# Patient Record
Sex: FEMALE | Race: WHITE | NOT HISPANIC OR LATINO | Employment: FULL TIME | URBAN - METROPOLITAN AREA
[De-identification: names, ages, dates, MRNs, and addresses within clinical notes are randomized per-mention and may not be internally consistent; named-entity substitution may affect disease eponyms.]

---

## 2018-11-12 ENCOUNTER — TRANSFERRED RECORDS (OUTPATIENT)
Dept: HEALTH INFORMATION MANAGEMENT | Facility: CLINIC | Age: 23
End: 2018-11-12

## 2018-12-20 ENCOUNTER — ALLIED HEALTH/NURSE VISIT (OUTPATIENT)
Dept: NEUROLOGY | Facility: CLINIC | Age: 23
End: 2018-12-20
Payer: COMMERCIAL

## 2018-12-20 ENCOUNTER — OFFICE VISIT (OUTPATIENT)
Dept: NEUROLOGY | Facility: CLINIC | Age: 23
End: 2018-12-20
Payer: COMMERCIAL

## 2018-12-20 VITALS
SYSTOLIC BLOOD PRESSURE: 113 MMHG | WEIGHT: 142.4 LBS | RESPIRATION RATE: 16 BRPM | HEART RATE: 85 BPM | DIASTOLIC BLOOD PRESSURE: 71 MMHG

## 2018-12-20 DIAGNOSIS — G40.319 GENERALIZED CONVULSIVE EPILEPSY WITH INTRACTABLE EPILEPSY (H): Primary | ICD-10-CM

## 2018-12-20 DIAGNOSIS — R56.9 SEIZURES (H): Primary | ICD-10-CM

## 2018-12-20 RX ORDER — LAMOTRIGINE 100 MG/1
TABLET ORAL
Qty: 90 TABLET | Refills: 11 | Status: SHIPPED | OUTPATIENT
Start: 2019-01-20 | End: 2019-05-16

## 2018-12-20 RX ORDER — LAMOTRIGINE 25 MG/1
TABLET ORAL
Qty: 100 TABLET | Refills: 1 | Status: SHIPPED | OUTPATIENT
Start: 2018-12-20 | End: 2019-02-28

## 2018-12-20 RX ORDER — ZONISAMIDE 100 MG/1
200 CAPSULE ORAL
COMMUNITY
Start: 2018-11-12 | End: 2019-05-16

## 2018-12-20 RX ORDER — OXCARBAZEPINE 300 MG/1
TABLET, FILM COATED ORAL
COMMUNITY
Start: 2018-11-12 | End: 2019-07-30

## 2018-12-20 RX ORDER — PSEUDOEPHEDRINE HCL 30 MG
TABLET ORAL EVERY 4 HOURS PRN
COMMUNITY

## 2018-12-20 ASSESSMENT — PAIN SCALES - GENERAL: PAINLEVEL: NO PAIN (0)

## 2018-12-20 NOTE — PATIENT INSTRUCTIONS
Times of Days           Medication Tablet Size Number of Tablets/Capsules Notes      Lamotrigine      9 AM after breakfast  (Morning)   5 PM after dinner  (Night)        Week 1       25 mg  (1 tablet)      0 mg    Lamotrigine 25 mg tablet     Week 2       25 mg   (1 tablet)    25 mg   (1 tablet)   Lamotrigine 25 mg tablet    Week 3      50 mg   (2 tablets)   25 mg   (1 tablet)   Lamotrigine 25 mg tablet    Week 4      50 mg   (2 tablets)   50 mg   (2 tablets)   Lamotrigine 25 mg tablet    Week 5     75 mg   (3 tablets)   50 mg   (2 tablets)   Lamotrigine 25 mg tablet    Week 6      75 mg   (3 tablets)   75 mg   (3 tablets)   Lamotrigine 25 mg tablet   Week  7   100 mg   (4 tablets)  75 mg   (3 tablets)  Lamotrigine 25 mg tablet   Week 8   100 mg   (4 tablets)  100 mg   (4 tablets)   lamotrigine 100 mg tablet    Week 9   125 mg   (5 tablets)  100 mg   (4 tablets)  Lamotrigine 25 mg and 100 mg tablets   Week 10   150 mg     100 mg    Lamotrigine 25 mg and 100 mg tablets   Week 11   175 mg     100 mg     Lamotrigine 25 mg and 100 mg tablets   Week 12   200 mg     100 mg     Lamotrigine 25 mg and 100 mg tablets     Monitor for side effects, especially rash and mood changes, if any concerns please call our office. [phone]    Estrogen can affect lamotrigine     Continue oxcarbazepine 600 -900 and zonisamide 100 mg at night       Kellee Nuñez MD

## 2018-12-20 NOTE — PROGRESS NOTES
NEW PATIENT NOTE    Service Date: 12/20/2018      Thank you for sending Ana Paula Ramirez to [facility].        HISTORY OF PRESENT ILLNESS:  The patient is a 23-year-old right-handed female who had her first seizure at the age of 17.  Her first seizure occur approximately around 03/17/2013.  She was at a friend's house and out of sleep, had what was described as a seizure.  Her whole body was stiff with shivering, low amplitude trembling and the repetitive clicking noise.  She had another one on 03/28/2013, again this was out of sleep and subsequently she had an EEG which was normal.      Over the years, she has had a total of 8 seizures and it seems that she averages approximately 1-2 per year.  She was initially treated with levetiracetam around 2014.  Max dose was 1500 mg per day and that was not entirely helpful, because she had severe side effects, mainly of severe depression and severe irritability.  She states that she was fighting with other people a lot.  She was then transitioned at the age of 19 to oxcarbazepine, which has been gradually increased over the years to current dose.  However, the patient did have an episode of hyponatremia with sodium of 125 in 2018 and her dose was lowered a little bit by Dr. Barajas and zonisamide 100 mg was added.  She states that her last 2 seizures were in 08/2018 and in 10/2018.  Her oxcarbazepine maximum dose has been up to 17 as of 10/03/2018 and we do not have a zonisamide level.       SEIZURE TYPES:     Seizure type 1, she states she feels as though she is going in and out with her vision.  She describes it as though her contacts are not in her eye and her vision just feels off.  This lasts approximately 2 minutes.  She is often able to get into a secure place and sit down so that way she does not get hurt.  She did have 1 seizure in the evening at work, but the majority of the seizures are at nighttime she states.  This is followed by loss of  consciousness and awareness, whole body stiffening, her eyes go back and she is low amplitude trembling and the clicking noise in her throat.  She has not bitten her tongue.  No urinary incontinence or no significant trauma with these specific seizures, but again, she does get herself into a safe herself to prevent bodily injury.  When asked if she has early morning jerks, she denies that.  Her mom has not witnessed that she stares off into space or is unresponsive.  The patient denies having recurrent episodes of auras.      TRIGGERS:  When asked, she thinks that seizures are triggered by fatigue, lack of sleep, when blood sugar is low, dehydration, menstruation, failure to take medications.      EPILEPSY-RELATED RISK FACTORS:  No history of encephalitis, meningitis, strokes or brain tumors.  She was born via emergent .  She was 6 pounds 4 ounces.  No history of developmental delay.  She was walking and talking at the right age.  No febrile seizures.  No family history of epilepsy.        Past medical testing 2018, she had video EEG for approximately 1 week and the reports all within normal limits.  She states that her seizure medications were reduced.  EEGs in , I not able to access those records.  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  This was 2013.  Per the report; I do not have the actual images on file.  We did not have imaging completed here at [facility].  EEG at [facility] in  was normal.  Laboratory tests, highest level of oxcarbazepine in 10/2018 was 17.      ALLERGIES:  None.  The patient does have adverse reaction to levetiracetam with leg pain and mood instability she states.        PAST MEDICAL HISTORY:  Attention deficit disorder.  She stopped taking Vyvanse a year ago because of concerns that it can cause seizures.      FAMILY MEDICAL HISTORY:  None.      PAST SURGICAL HISTORY:   None.      SOCIAL HISTORY:  She has no history of events,  physical, verbal or sexual trauma.  She had a stable upbringing and supportive family.  She is currently in college for graphic design.  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  She is not .  She drinks 2-3 drinks a couple of times per month.  Does not smoke, does not do recreational drugs.  She has coffee intermittently.       PSYCHOLOGICAL HISTORY:  She currently does not have any depressive symptoms.  She does have a little bit of anxiety.  She describes herself to be very friendly, talkative, spiritual, a people pleaser.  She describes her [employer] to be Y[place].      REVIEW OF SYSTEMS:  No headaches, no vision changes, no shortness of breath, chest pain, nausea, vomiting, diarrhea.  No upper or lower extremity weakness or numbness.        FEMALE HISTORY:  She started menstruating at the age of 15.  She is not pregnant.  She would like to get pregnant in the future.      EXAMINATION /71   Pulse 85   Resp 16   Wt 142 lb 6.4 oz (64.6 kg)   GENERAL:  Alert and oriented x3.   CARDIOVASCULAR:  Regular rate and rhythm, positive S1, S2.   LUNGS:  Clear to auscultation bilaterally.   ABDOMEN:  Nondistended, nontender.  Normal active bowel sounds.      NEUROLOGICAL EXAMINATION   Mental Status and Higher Cortical Functions:  Alert and oriented to person, place, and time.  Speech fluent, with intact naming and repetition. No dysarthria.  Cranial Nerves (II-XII):  Pupils equal, round, and reactive to light.  Extraocular movements full with no nystagmus.  Visual fields full to confrontation.  Facial sensation intact to light touch, temperature, and pin prick.  Face symmetric at rest and with activation.  Hearing intact to finger rub bilaterally.  Tongue midline and palate elevation symmetric.  Sternocleidomastoid and trapezius 5/5 bilaterally.   Fundoscopic examination was unremarkable for pallor or edema bilaterally.    Motor:  Normal tone,  normal bulk, and no pronator drift.  No tremors or fasciculations. Arm/hand circumduction was symmetric.  Motor strength 5/5 in upper and lower extremities.   Sensation:  Intact to light touch, vibration, and temperature.    Coordination:  Normal finger-nose-finger, fine finger movements, and rapid alternating movements.  No ataxia or dysmetria.     Reflexes:  Deep tendon reflexes 2+ and symmetric throughout.    Gait:  Casual gait and stance normal.         ASSESSMENT:  A 23-year-old female who presents with recurrent seizure-like spells.  These spells are described as an aura, vision changes followed by whole body stiffening with low amplitude trembling and repetitive clicking noises.  I suspect the spell as a generalized tonic-clonic seizure.  This typically happens mainly at nighttime.  Age of onset was 17.  EEG today was notable for a burst of sharply contoured delta activity in the awake state.  It is difficult to decipher if these are generalized epileptiform discharges or a burst of delta slowing that are simply sharply contoured and representing a mild encephalopathy.  I suspect she does have epilepsy and just not sure if it is generalized or partial epilepsy.  Her aura suggests perhaps it may be a partial epilepsy.  However, with the age of onset, we should consider that this is a juvenile onset generalized epilepsy.  She does not have features consistent with juvenile myoclonic epilepsy, however.  I think it is best that we use broad-spectrum agent.  I would recommend that we start lamotrigine and wean her off of oxcarbazepine.  Additionally, she is not able to tolerate oxcarbazepine because her sodium dropped to 125.  Perhaps a combination of oxcarbazepine and zonisamide may be a beneficial combination for her.  Additionally, other medications that are broad-spectrum that may be considered in the future are Depakote (if on birth control), topiramate (if on birth control), Fycompa, Vimpat; not in this  particular order.      At this time, I do not think it would be helpful to do inpatient video EEG monitoring because her spells are 1-2 times per year, and I suspect that we might not capture anything.  She did have a 1-week evaluation at [facility] that was unrevealing.  I recommend we focus her energy on optimizing her anti-seizure medications.  The patient was advised not to drive and follow seizure precautions.      PLAN:   1.  Start lamotrigine.   2.  Continue zonisamide and oxcarbazepine.   3.  Do not drive.   4.  Check lamotrigine in 3 months.   5.  Nurse call in 2 weeks to check on how she is tolerating initiation of lamotrigine.   6.  Follow with Dr. Nuñez in 3 months.              Times of Days           Medication Tablet Size Number of Tablets/Capsules Notes      Lamotrigine      9 AM after breakfast  (Morning)   5 PM after dinner  (Night)        Week 1       25 mg  (1 tablet)      0 mg    Lamotrigine 25 mg tablet     Week 2       25 mg   (1 tablet)    25 mg   (1 tablet)   Lamotrigine 25 mg tablet    Week 3      50 mg   (2 tablets)   25 mg   (1 tablet)   Lamotrigine 25 mg tablet    Week 4      50 mg   (2 tablets)   50 mg   (2 tablets)   Lamotrigine 25 mg tablet    Week 5     75 mg   (3 tablets)   50 mg   (2 tablets)   Lamotrigine 25 mg tablet    Week 6      75 mg   (3 tablets)   75 mg   (3 tablets)   Lamotrigine 25 mg tablet   Week  7   100 mg   (4 tablets)  75 mg   (3 tablets)  Lamotrigine 25 mg tablet   Week 8   100 mg   (4 tablets)  100 mg   (4 tablets)   lamotrigine 100 mg tablet    Week 9   125 mg   (5 tablets)  100 mg   (4 tablets)  Lamotrigine 25 mg and 100 mg tablets   Week 10   150 mg     100 mg    Lamotrigine 25 mg and 100 mg tablets   Week 11   175 mg     100 mg     Lamotrigine 25 mg and 100 mg tablets   Week 12   200 mg     100 mg     Lamotrigine 25 mg and 100 mg tablets     Monitor for side effects, especially rash and mood changes, if any concerns please call our office.  [phone]    Estrogen can affect lamotrigine     Continue oxcarbazepine 600 -900 and zonisamide 100 mg at night       Thank you for allowing us to participate in Ms. Elisha Ramirez's care.        cc:   Sera Barajas MD    [facility]   [address] [facility] [place] [address]         I spent 60 minutes with the patient. During this time counseling and coordination of care exceeded 50% of the face to face visit time. I addressed all questions the patient/caregiver raised in regards to the patient's medical care.     Kellee Nuñez MD            D: 2018   T: 2018   MT: MIREILLE      Name:     ELISHA WOOD   MRN:      [id number]        Account:      [id number]   :      1995           Service Date: 2018      Document: [id number]

## 2018-12-20 NOTE — LETTER
Patient:  Ana Paula Rosenberg  :   1995  MRN:     [id number]        Ms.Taylor Rosenberg  [address] [facility] [address]        2019    Dear ,    We are writing to inform you of your test results.    Your test results fall within the expected range(s) or remain unchanged from previous results.  Years and is semi-levels are low.  It is not entirely clear why this is.  We can review this in clinic on your next visit.  Please continue lamotrigine titration.  If you have any questions give our clinic a call. Please continue with current treatment plan.    Resulted Orders   Oxcarbazepine level   Result Value Ref Range    10 Hydroxy Metabolite Level 23.8 10.0 - 35.0 ug/ml      Comment:      (Note)             Therapeutic efficacy has been demonstrated in             patients with trough 10-hydroxy metabolite             concentrations of 10.0 - 35.0 ug/ml.             Toxic concentrations have not been established.  Analysis performed by [facility] [place] [address]     Zonisamide Level Quantitative   Result Value Ref Range    Zonisamide Level Quant 3 (L) 10 - 40 ug/mL      Comment:      (Note)  INTERPRETIVE INFORMATION: Zonisamide  Therapeutic range: Not well established.  Toxic: Greater than 80 ug/mL  The proposed therapeutic range for seizure control is 10-40   ug/mL. Toxic concentrations may cause coma, seizures and   cardiac abnormalities. Pharmacokinetics varies widely,   particularly with co-medications and/or compromised renal   function.  Performed by [facility],  [address] [place] [id number] [phone]  [email or web address], Farhat Naranjo MD, Lab. Director     Sodium   Result Value Ref Range    Sodium 134 133 - 144 mmol/L       Kellee Nuñez MD               [id number]  1995

## 2018-12-20 NOTE — LETTER
2018       RE: Ana Paula Rosenberg  : 1995   MRN: [id number]      Dear Colleague,    Thank you for referring your patient, Ana Paula Rosenberg, to the [facility] EPILEPSY CARE at [facility]. Please see a copy of my visit note below.    NEW PATIENT NOTE    Service Date: 2018      Thank you for sending Ana Paula Ramirez to [facility] Epilepsy [facility].        HISTORY OF PRESENT ILLNESS:  The patient is a 23-year-old right-handed female who had her first seizure at the age of 17.  Her first seizure occur approximately around 2013.  She was at a friend's house and out of sleep, had what was described as a seizure.  Her whole body was stiff with shivering, low amplitude trembling and the repetitive clicking noise.  She had another one on 2013, again this was out of sleep and subsequently she had an EEG which was normal.      Over the years, she has had a total of 8 seizures and it seems that she averages approximately 1-2 per year.  She was initially treated with levetiracetam around .  Max dose was 1500 mg per day and that was not entirely helpful, because she had severe side effects, mainly of severe depression and severe irritability.  She states that she was fighting with other people a lot.  She was then transitioned at the age of 19 to oxcarbazepine, which has been gradually increased over the years to current dose.  However, the patient did have an episode of hyponatremia with sodium of 125 in 2018 and her dose was lowered a little bit by Dr. Barajas and zonisamide 100 mg was added.  She states that her last 2 seizures were in 2018 and in 10/2018.  Her oxcarbazepine maximum dose has been up to 17 as of 10/03/2018 and we do not have a zonisamide level.       SEIZURE TYPES:     Seizure type 1, she states she feels as though she is going in and out with her vision.  She describes it as though her contacts are not in her eye and her vision just feels off.   This lasts approximately 2 minutes.  She is often able to get into a secure place and sit down so that way she does not get hurt.  She did have 1 seizure in the evening at work, but the majority of the seizures are at nighttime she states.  This is followed by loss of consciousness and awareness, whole body stiffening, her eyes go back and she is low amplitude trembling and the clicking noise in her throat.  She has not bitten her tongue.  No urinary incontinence or no significant trauma with these specific seizures, but again, she does get herself into a safe herself to prevent bodily injury.  When asked if she has early morning jerks, she denies that.  Her mom has not witnessed that she stares off into space or is unresponsive.  The patient denies having recurrent episodes of auras.      TRIGGERS:  When asked, she thinks that seizures are triggered by fatigue, lack of sleep, when blood sugar is low, dehydration, menstruation, failure to take medications.      EPILEPSY-RELATED RISK FACTORS:  No history of encephalitis, meningitis, strokes or brain tumors.  She was born via emergent .  She was 6 pounds 4 ounces.  No history of developmental delay.  She was walking and talking at the right age.  No febrile seizures.  No family history of epilepsy.        Past medical testing 2018, she had video EEG for approximately 1 week and the reports all within normal limits.  She states that her seizure medications were reduced.  EEGs in , I not able to access those records.  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  This was 2013.  Per the report; I do not have the actual images on file.  We did not have imaging completed here at [facility].  EEG at [facility] in  was normal.  Laboratory tests, highest level of oxcarbazepine in 10/2018 was 17.      ALLERGIES:  None.  The patient does have adverse reaction to  levetiracetam with leg pain and mood instability she states.        PAST MEDICAL HISTORY:  Attention deficit disorder.  She stopped taking Vyvanse a year ago because of concerns that it can cause seizures.      FAMILY MEDICAL HISTORY:  None.      PAST SURGICAL HISTORY:  None.      SOCIAL HISTORY:  She has no history of events,  physical, verbal or sexual trauma.  She had a stable upbringing and supportive family.  She is currently in college for [employer] design.  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  She is not .  She drinks 2-3 drinks a couple of times per month.  Does not smoke, does not do recreational drugs.  She has coffee intermittently.       PSYCHOLOGICAL HISTORY:  She currently does not have any depressive symptoms.  She does have a little bit of anxiety.  She describes herself to be very friendly, talkative, spiritual, a people pleaser.  She describes her Islam to be Faith.      REVIEW OF SYSTEMS:  No headaches, no vision changes, no shortness of breath, chest pain, nausea, vomiting, diarrhea.  No upper or lower extremity weakness or numbness.        FEMALE HISTORY:  She started menstruating at the age of 15.  She is not pregnant.  She would like to get pregnant in the future.      EXAMINATION /71   Pulse 85   Resp 16   Wt 142 lb 6.4 oz (64.6 kg)   GENERAL:  Alert and oriented x3.   CARDIOVASCULAR:  Regular rate and rhythm, positive S1, S2.   LUNGS:  Clear to auscultation bilaterally.   ABDOMEN:  Nondistended, nontender.  Normal active bowel sounds.      NEUROLOGICAL EXAMINATION   Mental Status and Higher Cortical Functions:  Alert and oriented to person, place, and time.  Speech fluent, with intact naming and repetition. No dysarthria.  Cranial Nerves (II-XII):  Pupils equal, round, and reactive to light.  Extraocular movements full with no nystagmus.  Visual fields full to confrontation.  Facial sensation intact to light touch, temperature, and pin prick.  Face symmetric at  rest and with activation.  Hearing intact to finger rub bilaterally.  Tongue midline and palate elevation symmetric.  Sternocleidomastoid and trapezius 5/5 bilaterally.   Fundoscopic examination was unremarkable for pallor or edema bilaterally.    Motor:  Normal tone, normal bulk, and no pronator drift.  No tremors or fasciculations. Arm/hand circumduction was symmetric.  Motor strength 5/5 in upper and lower extremities.   Sensation:  Intact to light touch, vibration, and temperature.    Coordination:  Normal finger-nose-finger, fine finger movements, and rapid alternating movements.  No ataxia or dysmetria.     Reflexes:  Deep tendon reflexes 2+ and symmetric throughout.    Gait:  Casual gait and stance normal.         ASSESSMENT:  A 23-year-old female who presents with recurrent seizure-like spells.  These spells are described as an aura, vision changes followed by whole body stiffening with low amplitude trembling and repetitive clicking noises.  I suspect the spell as a generalized tonic-clonic seizure.  This typically happens mainly at nighttime.  Age of onset was 17.  EEG today was notable for a burst of sharply contoured delta activity in the awake state.  It is difficult to decipher if these are generalized epileptiform discharges or a burst of delta slowing that are simply sharply contoured and representing a mild encephalopathy.  I suspect she does have epilepsy and just not sure if it is generalized or partial epilepsy.  Her aura suggests perhaps it may be a partial epilepsy.  However, with the age of onset, we should consider that this is a juvenile onset generalized epilepsy.  She does not have features consistent with juvenile myoclonic epilepsy, however.  I think it is best that we use broad-spectrum agent.  I would recommend that we start lamotrigine and wean her off of oxcarbazepine.  Additionally, she is not able to tolerate oxcarbazepine because her sodium dropped to 125.  Perhaps a combination  of oxcarbazepine and zonisamide may be a beneficial combination for her.  Additionally, other medications that are broad-spectrum that may be considered in the future are Depakote (if on birth control), topiramate (if on birth control), Fycompa, Vimpat; not in this particular order.      At this time, I do not think it would be helpful to do inpatient video EEG monitoring because her spells are 1-2 times per year, and I suspect that we might not capture anything.  She did have a 1-week evaluation at [place] that was unrevealing.  I recommend we focus her energy on optimizing her anti-seizure medications.  The patient was advised not to drive and follow seizure precautions.      PLAN:   1.  Start lamotrigine.   2.  Continue zonisamide and oxcarbazepine.   3.  Do not drive.   4.  Check lamotrigine in 3 months.   5.  Nurse call in 2 weeks to check on how she is tolerating initiation of lamotrigine.   6.  Follow with Dr. Nuñez in 3 months.              Times of Days           Medication Tablet Size Number of Tablets/Capsules Notes      Lamotrigine      9 AM after breakfast  (Morning)   5 PM after dinner  (Night)        Week 1       25 mg  (1 tablet)      0 mg    Lamotrigine 25 mg tablet     Week 2       25 mg   (1 tablet)    25 mg   (1 tablet)   Lamotrigine 25 mg tablet    Week 3      50 mg   (2 tablets)   25 mg   (1 tablet)   Lamotrigine 25 mg tablet    Week 4      50 mg   (2 tablets)   50 mg   (2 tablets)   Lamotrigine 25 mg tablet    Week 5     75 mg   (3 tablets)   50 mg   (2 tablets)   Lamotrigine 25 mg tablet    Week 6      75 mg   (3 tablets)   75 mg   (3 tablets)   Lamotrigine 25 mg tablet   Week  7   100 mg   (4 tablets)  75 mg   (3 tablets)  Lamotrigine 25 mg tablet   Week 8   100 mg   (4 tablets)  100 mg   (4 tablets)   lamotrigine 100 mg tablet    Week 9   125 mg   (5 tablets)  100 mg   (4 tablets)  Lamotrigine 25 mg and 100 mg tablets   Week 10   150 mg     100 mg    Lamotrigine 25 mg and 100 mg tablets    Week 11   175 mg     100 mg     Lamotrigine 25 mg and 100 mg tablets   Week 12   200 mg     100 mg     Lamotrigine 25 mg and 100 mg tablets     Monitor for side effects, especially rash and mood changes, if any concerns please call our office. [phone]    Estrogen can affect lamotrigine     Continue oxcarbazepine 600 -900 and zonisamide 100 mg at night     Thank you for allowing us to participate in Ms. Elisha Ramirez's care.        I spent 60 minutes with the patient. During this time counseling and coordination of care exceeded 50% of the face to face visit time. I addressed all questions the patient/caregiver raised in regards to the patient's medical care.     cc:   Sera Barajas MD    [facility]   [address] [place] [id number]            D: 2018   T: 2018   MT: MIREILLE    Name:     ELISHA WOOD   MRN:      [id number]        Account:      [id number]   :      1995           Service Date: 2018   Document: [id number]        Kellee Nuñez MD

## 2018-12-21 LAB — SODIUM SERPL-SCNC: 134 MMOL/L (ref 133–144)

## 2018-12-22 LAB
10OH-CARBAZEPINE SERPL-MCNC: 23.8 UG/ML
ZONISAMIDE SERPL-MCNC: 3 UG/ML (ref 10–40)

## 2018-12-24 NOTE — PROCEDURES
Procedure Date: 12/20/2018      EEG #:  [id number]      This is a 3-hour EEG on 12/20/2018.      SOURCE FILE DURATION:  3 hours.      PATIENT INFORMATION:  A 23-year-old female with a history of seizures.  EEG is being done to evaluate for seizures.      MEDICATIONS:  Trileptal.     TECHNICAL SUMMARY: This video EEG monitoring procedure was performed with 23 scalp electrodes in 10-20 system placements, and additional scalp, precordial and other surface electrodes used for electrical referencing and artifact detection. Video was reviewed intermittently by EEG technologist and physician for electroclinical seizures.      BACKGROUND ACTIVITY:  During wakefulness, the background activity consists of synchronous and symmetric, well modulated, 9-10 Hz posterior dominant rhythm. The posterior dominant rhythm attenuated with eye opening. During drowsiness, the background activity waxed and waned and there were periods of slowing and attenuation of the posterior alpha rhythm. Stage I sleep and Stage II sleep was recorded in which synchronous and symmetrical vertex waves , K-complexes and sleep spindles  were identified.  No focal abnormalities were observed.    ACTIVATION PROCEDURES:  Photic stimulation and hyperventilation was completed.  No significant abnormalities were seen.      EPILEPTIFORM DISCHARGE:  The patient did have bursts of 4 Hz discharge at 10/11/21.  This discharge had a low amplitude sharp wave followed by an aftergoing slow wave.  When you analyze this discharge on a referential montage, we see maximum negativity in the occipital region and a low voltage sharp wave followed by an aftergoing slow wave.      This discharge is concerning for atypical generalized epileptiform discharge with maximum negativity in the occipital region.  A fragmented discharge similar to this was seen at 10:12.  Certainly it would be helpful to record more of these discharges, but they were only seen a few times.  Another example  is at 10:15:22, again maximally involving the occipital region.      ICTAL:  None.      IMPRESSION:  This awake and sleep video EEG is abnormal due to the presence of what appeared to be atypical generalized epileptiform discharges with maximum negativity in the occipital region.  These discharges are thought to possibly represent epileptiform discharges and they were seen a few times in the record.  It is not entirely clear if these are atypical generalized epileptiform discharges or focal discharges with secondary bilateral synchrony.  No obvious electrographic seizures were seen in this EEG.  Clinical correlation is advised.         JULIETTE BERNAL MD             D: 2018   T: 2018   MT: al      Name:     ELISHA WOOD   MRN:      [id number]        Account:        [id number]   :      1995           Procedure Date: 2018      Document: [id number]

## 2019-01-03 ENCOUNTER — TELEPHONE (OUTPATIENT)
Dept: NEUROLOGY | Facility: CLINIC | Age: 24
End: 2019-01-03

## 2019-01-03 NOTE — TELEPHONE ENCOUNTER
"Nurse received In-Basket message as follows:    Saleem Lynn CMA sent to [facility]             Caller: Marti     Relationship to Patient: mother     Call Back Number: [phone] or [phone]     Reason for Call: Mom vaguely recall Dr. nelson asking them to start the new med on 1/20, wants to know If this is still correct and reason.      Nurse returned call to to patient left voice mail with call back number and name.     Patient is triturating Lamotrigine and rather than a \"new\" medication it is a larger size (200 mg ) tab apparently for convenience as patient is triturating to 200 / 100.    Nurse will attempt call again if a return call is not received.  "

## 2019-01-08 ENCOUNTER — TELEPHONE (OUTPATIENT)
Dept: NEUROLOGY | Facility: CLINIC | Age: 24
End: 2019-01-08

## 2019-01-08 NOTE — TELEPHONE ENCOUNTER
Received call from patient's Mother.    Mother was under the impression that patient was to start new medication on the 20 th of this Month, however, she was to change Lamotrigine tab sizes at that time to 200 mg tab for convenience of not taking so many smaller tabs.  Mother indicates that she has not started on Lamotrigine at this point, thinking she was to start on the 20 th.    Nurse indicated to Mother that she should start Lamotrigine now, and that it on start up schedule.  Mother indicated that she does not have the schedule, so nurse printed it out for her and placed it in the mail and instructed Mother to start patient on 25 mg each AM for the first week and the titration schedule would arrive in time for the next step.    Mother is in agreement with plan and will  Lamotrigine asap.    Mother ask if she should change any of her current meds, and per Dr. Nuñez's clinic note, patient is to continue Zonisamide and Oxcarbazepine; this was relayed to Mother.     Also notified Mother that a nurse was to call in two week to check how titration of Lamotrigine was going, [facility] to have this call moved further out and add a second nurse call after about a month.

## 2019-01-08 NOTE — TELEPHONE ENCOUNTER
Incoming call from patient's mother, who was at the pharmacy collecting medications for her daughter.  Mother wanted clarification of the medication plan.  I reviewed with her that the existing medications would remain unchanged, and that lamotrigine would start.  We reviewed the MEDRANO increasing schedule up to week 8. Mom is aware the increasing dose will continue beyond that.    I cautioned that if a rash occurs, or blistering is noted,it could be a sign of an unusual but potentially serious reaction to lamotrigine. I instructed mom to contact a gay care provider for assessment as the medication would need to be changed if the rash was related to the lamotrigine    No other questions at this time    Instructed to call if questions or concerns.

## 2019-01-31 ENCOUNTER — TELEPHONE (OUTPATIENT)
Dept: NEUROLOGY | Facility: CLINIC | Age: 24
End: 2019-01-31

## 2019-01-31 NOTE — TELEPHONE ENCOUNTER
Ana Paula is doing well from a seizure standpoint.  Mom would like to know what time Ana Paula should take her medications.  Mom reports that Ana Paula takes morning medications at 9:30 AM. Advised that Ana Paula should continue this dose. Ana Paula will take her evening dose of medication around 6:00 PM  Mom is agreeable.    Medications were reviewed.    Current/confirmed ASDs    Lamictal 50 mg - 50 mg HS (goal dose is 200-125)  Trileptal 600-900  Zonisamide (100) 200 mg    Mom did not have any additional questions or concerns.

## 2019-02-05 ENCOUNTER — TELEPHONE (OUTPATIENT)
Dept: NEUROLOGY | Facility: CLINIC | Age: 24
End: 2019-02-05

## 2019-02-28 DIAGNOSIS — G40.319 GENERALIZED CONVULSIVE EPILEPSY WITH INTRACTABLE EPILEPSY (H): ICD-10-CM

## 2019-02-28 RX ORDER — LAMOTRIGINE 25 MG/1
TABLET ORAL
Qty: 70 TABLET | Refills: 0 | Status: SHIPPED | OUTPATIENT
Start: 2019-02-28 | End: 2019-03-08

## 2019-02-28 NOTE — TELEPHONE ENCOUNTER
Nurse received In-Basket message as follows:    Nelson George MA sent to [facility]             Caller: Marti     Relationship to Patient: mom     Call Back Number: [phone]     Reason for Call: Needs a dosage correction for Lamictal.      Nurse returned call to Mother who indicates that patient forgot to titrate her Lamotrigine dose; she is currently at 75 mg BID and completely out of 25 mg tabs.    Review of titration schedule indicates she will need 63 additional 25 mg tabs to get to full titration of 200 / 100, if she remains on schedule ( patient and mother have been having difficulty with titration, and called several time in this regard ).    Nurse sent order for 70 - 25 mg tabs.  [facility] CHANTEL Landrum.

## 2019-03-08 DIAGNOSIS — G40.319 GENERALIZED CONVULSIVE EPILEPSY WITH INTRACTABLE EPILEPSY (H): ICD-10-CM

## 2019-03-08 RX ORDER — LAMOTRIGINE 25 MG/1
TABLET ORAL
Qty: 90 TABLET | Refills: 1 | Status: SHIPPED | OUTPATIENT
Start: 2019-03-08 | End: 2019-03-11

## 2019-03-08 NOTE — TELEPHONE ENCOUNTER
"Call for an urgent refill on lamotrigine 25 mg tabs. (poor patient planning for having supplies while out of town)    She has been increasing slower that the previously provided titration schedule (per the patient, this is okay with  based on their conversations)    Patient currently at \"week 7\" dosing interval.  Will use a combination of 25mg size and 100mg size until goal dose is reached.          "

## 2019-03-11 DIAGNOSIS — G40.319 GENERALIZED CONVULSIVE EPILEPSY WITH INTRACTABLE EPILEPSY (H): ICD-10-CM

## 2019-03-11 RX ORDER — LAMOTRIGINE 25 MG/1
TABLET ORAL
Qty: 90 TABLET | Refills: 1 | Status: SHIPPED | OUTPATIENT
Start: 2019-03-11 | End: 2019-05-16

## 2019-04-15 ENCOUNTER — TELEPHONE (OUTPATIENT)
Dept: NEUROLOGY | Facility: CLINIC | Age: 24
End: 2019-04-15

## 2019-04-15 ENCOUNTER — TRANSFERRED RECORDS (OUTPATIENT)
Dept: HEALTH INFORMATION MANAGEMENT | Facility: CLINIC | Age: 24
End: 2019-04-15

## 2019-04-15 NOTE — TELEPHONE ENCOUNTER
Nurse received In-Basket message as follows:    Ana Paula Stephens sent to John Ibanez, RN             Caller: Marti     Relationship to Patient: mom     Call Back Number: [phone]     Reason for Call: Mom would like to discuss medication changes.      Nurse returned call to Mother who is asking about reducing Trileptal as patient has reached full titration of Lamotrigine as of this AM.    Mother notes that patient is very tired and they would like to start the reduction now.    Nurse indicated he would speak to Dr. Nuñez in this regard and give her a call back.

## 2019-04-16 NOTE — TELEPHONE ENCOUNTER
"Received reply from Dr. Nuñez:    Kellee Nuñez MD sent to John Ibanez, RN             We can lower oxcarbazepine   Week 1: 600-600   Week 2: 300-600   Week 3: 300-300     Then have her come in to see us for review. Thanks. Kellee CHAN. MD Joaquin      Nurse placed call to Mother with information to reduce Oxcarbazepine, and Mother seems very frazzled and on edge.  She indicates that she is afraid to decrease Trileptal now as patient is not feeling well - she can only give very vague indication of what is wrong, states that patient has leg pain, she has been starving all the time, and very tired.  When nurse ask for specifics, the information gleamed from Mother is that she has no temp, no nausea, no visual or balance issues, She has leg pain, lethargy, and is always hungry.  Mother simply keeps repeating \"she just dose not feel well\".    She indicates fear of patient having a seizure now if she decreases the trileptal.  Repeats that she has been on it for a long time now.  She would like to speak directly to Dr. Nuñez, indicates that she will probably call back right away in the morning.  Would not consider decreasing the trileptal even with assurance that Lamotrigine is not responsible for these effects and a seizure is unlikely.      "

## 2019-04-17 ENCOUNTER — TELEPHONE (OUTPATIENT)
Dept: NEUROLOGY | Facility: CLINIC | Age: 24
End: 2019-04-17

## 2019-04-17 NOTE — TELEPHONE ENCOUNTER
[facility]             Caller: Marti     Relationship to Patient: mom     Call Back Number: [phone]     Reason for Call: Mom would like a call back to discuss pt. Would not specify     Marti would like to get Ana Paula [facility].  Ana Paula has been titrating Lamictal with goal of 200-100. She continues to take her current Trileptal dose.  Mom reports that Ana Paula could begin tapering off Trileptal.  Ana Paula is reporting leg pain. Denies rash. Hungry all the time. Increased hunger began one month ago.    Ana Paula isn't feeling well to the point mom is concerned she may pass out.  She has laid in bed x 2 days.     PLAN:  Schedule a return visit with Dr. Nuñez

## 2019-05-01 ENCOUNTER — TELEPHONE (OUTPATIENT)
Dept: NEUROLOGY | Facility: CLINIC | Age: 24
End: 2019-05-01

## 2019-05-01 NOTE — TELEPHONE ENCOUNTER
Mom called back and would like to know if the patient is going to be tapered off of this medication ever.

## 2019-05-16 ENCOUNTER — OFFICE VISIT (OUTPATIENT)
Dept: NEUROLOGY | Facility: CLINIC | Age: 24
End: 2019-05-16
Payer: COMMERCIAL

## 2019-05-16 VITALS — WEIGHT: 136 LBS | HEART RATE: 81 BPM | SYSTOLIC BLOOD PRESSURE: 112 MMHG | DIASTOLIC BLOOD PRESSURE: 75 MMHG

## 2019-05-16 DIAGNOSIS — G40.319 GENERALIZED CONVULSIVE EPILEPSY WITH INTRACTABLE EPILEPSY (H): ICD-10-CM

## 2019-05-16 RX ORDER — LAMOTRIGINE 100 MG/1
TABLET ORAL
Qty: 90 TABLET | Refills: 11 | Status: SHIPPED | OUTPATIENT
Start: 2019-05-16 | End: 2019-07-30

## 2019-05-16 RX ORDER — ZONISAMIDE 100 MG/1
CAPSULE ORAL
Qty: 120 CAPSULE | Refills: 11 | Status: SHIPPED | OUTPATIENT
Start: 2019-05-16 | End: 2019-07-30

## 2019-05-16 RX ORDER — LAMOTRIGINE 25 MG/1
TABLET ORAL
Qty: 60 TABLET | Refills: 1 | Status: SHIPPED | OUTPATIENT
Start: 2019-05-16 | End: 2019-07-30

## 2019-05-16 NOTE — PATIENT INSTRUCTIONS
Medication Name   Tablet Size         AM  (morning)   PM (Night)   Notes    Week 1  oxcarbazepine 300 mg   1 tablet    1 tablet      Start 5/20/19  zonisamide 100 mg   0 tablet    2 tablet   increase zonisamide      lamotrigine 100 mg   2 tablet    1 tablet        lamotrigine 25 mg   1 tablet    Increase lamotrigine                   Medication Name   Tablet Size         AM  (morning)   PM (Night)   Notes    Week 2  oxcarbazepine 300 mg   0 tablet    1 tablet   decrease oxcarbazepine    5/27/19  zonisamide 100 mg   0 tablet    2 tablet        lamotrigine 100 mg   2 tablet    1 tablet        lamotrigine 25 mg   2 tablet    Increase lamotrigine                 Medication Name   Tablet Size         AM  (morning)   PM (Night)   Notes   Week 3  oxcarbazepine 300 mg   0 tablet    1 tablet     6/3/19  zonisamide 100 mg   0 tablet    3 tablet   increase zonisamide      lamotrigine 100 mg   2 tablet    1 tablet        lamotrigine 25 mg   2 tablet             AFTER CA              Medication Name   Tablet Size         AM  (morning)   PM (Night)   Notes   After [place] trip   oxcarbazepine 300 mg   0 tablet    0 tablet  Stop oxcarbazepine      zonisamide 100 mg   0 tablet    3 tablet        lamotrigine 100 mg   2 tablet    1 tablet        lamotrigine 25 mg   2 tablet           Check with insurance medication for cost for vimpat and fycompa   IUD Mirena   Start folic 2 mg per day if sexually active       If you feel like you are going to have a seizure, we can increase zonisamide. Drink lots of water to avoid kidney stones.     CONTINUE TAKING YOUR OTHER MEDICATIONS AS PREVIOUSLY DIRECTED.  IF YOU  HAVE ANY SIDE EFFECTS OR CONCERNS ABOUT YOUR ANTIEPILEPTIC DRUG CALL [facility] OFFICE [phone]. PLEASE FOLLOW MEDICATION CHANGES AS ADVISED.     This titration schedule was revive wed with patient or caregiver. They expressed understanding of these antiepileptic drug changes.     JULIETTE BERNAL MD

## 2019-05-16 NOTE — LETTER
2019     RE: Ana Paula Rosenberg  : 1995   MRN: [id number]      Dear Colleague,    Thank you for referring your patient, Ana Paula Rosenberg, to the [facility] EPILEPSY CARE at [facility]. Please see a copy of my visit note below.    [facility] Epilepsy Care Progress Note    Patient:  Ana Paula Rosenberg  :  1995   Age:  23 year old   Today's Office Visit:  2019    Epilepsy Data:  First seizure at the age of 17.  Her first seizure occur approximately around 2013.  She was at a friend's house and out of sleep, had what was described as a seizure.  Her whole body was stiff with shivering, low amplitude trembling and the repetitive clicking noise.  She had another one on 2013, again this was out of sleep and subsequently she had an EEG which was normal.      Over the years, she has had a total of 8 seizures and it seems that she averages approximately 1-2 per year.  She was initially treated with levetiracetam around .  Max dose was 1500 mg per day and that was not entirely helpful, because she had severe side effects, mainly of severe depression and severe irritability.  She states that she was fighting with other people a lot.  She was then transitioned at the age of 19 to oxcarbazepine, which has been gradually increased over the years to current dose.  However, the patient did have an episode of hyponatremia with sodium of 125 in 2018 and her dose was lowered a little bit by Dr. Barajas and zonisamide 100 mg was added.  She states that her last 2 seizures were in 2018 and in 10/2018.  Her oxcarbazepine maximum dose has been up to 17 as of 10/03/2018 and we do not have a zonisamide level.       SEIZURE TYPES:     Seizure type 1, she states she feels as though she is going in and out with her vision.  She describes it as though her contacts are not in her eye and her vision just feels off.  This lasts approximately 2 minutes.  She is often able to  get into a secure place and sit down so that way she does not get hurt.  She did have 1 seizure in the evening at work, but the majority of the seizures are at nighttime she states.  This is followed by loss of consciousness and awareness, whole body stiffening, her eyes go back and she is low amplitude trembling and the clicking noise in her throat.  She has not bitten her tongue.  No urinary incontinence or no significant trauma with these specific seizures, but again, she does get herself into a safe herself to prevent bodily injury.  When asked if she has early morning jerks, she denies that.  Her mom has not witnessed that she stares off into space or is unresponsive.  The patient denies having recurrent episodes of auras.      Interval History:     Last seizure was 10/2018 ( generalized tonic-clonic convulsion) and prior to 2018 and prior to this she had one generalized tonic-clonic convulsion per year.     TRIGGERS:  When asked, she thinks that seizures are triggered by fatigue, lack of sleep, when blood sugar is low, dehydration, menstruation, failure to take medications.      EPILEPSY-RELATED RISK FACTORS:  No history of encephalitis, meningitis, strokes or brain tumors.  She was born via emergent .  She was 6 pounds 4 ounces.  No history of developmental delay.  She was walking and talking at the right age.  No febrile seizures.  No family history of epilepsy.        Past medical testing 2018, she had video EEG for approximately 1 week and the reports all within normal limits.  She states that her seizure medications were reduced.  EEGs in 2013, I not able to access those records.  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  This was 2013.  Per the report; I do not have the actual images on file.  We did not have imaging completed here at [facility].  EEG at [facility] in 2013 was normal.  Laboratory  tests, highest level of oxcarbazepine in 10/2018 was 17.     Prior to Admission medications    Medication Sig Start Date End Date Taking? Authorizing Provider   Ibuprofen (ADVIL PO) Take  by mouth as needed.   Yes Reported, Patient   lamoTRIgine (LAMICTAL) 100 MG tablet 200 mg am and 100 mg pm 1/20/19  Yes Kellee Nuñez MD   lamoTRIgine (LAMICTAL) 25 MG tablet Titrate to 200 mg am and 100 mg pm (use in combination with 100mg tabs) 3/11/19  Yes Kellee Nuñez MD   OXcarbazepine (TRILEPTAL) 300 MG tablet Take 600mg in AM and 900mg in PM 11/12/18  Yes Reported, Patient   zonisamide (ZONEGRAN) 100 MG capsule Take 200 mg by mouth 11/12/18 11/17/19 Yes Reported, Patient   pseudoePHEDrine (SUDAFED) 30 MG tablet Take by mouth every 4 hours as needed for congestion    Reported, Patient       Lamotrigine 200-100  Zonisamide 100 mg pm   Oxcarbazepine 300-300       PAST MEDICAL HISTORY:  Attention deficit disorder.  She stopped taking Vyvanse a year ago because of concerns that it can cause seizures.        SOCIAL HISTORY:  She has no history of events,  physical, verbal or sexual trauma.  She had a stable upbringing and supportive family.  She is currently in college for [employer].  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  She is not .  She drinks 2-3 drinks a couple of times per month.  Does not smoke, does not do recreational drugs.  She has coffee intermittently.       PSYCHOLOGICAL HISTORY:  She currently does not have any depressive symptoms.  She does have a little bit of anxiety.  She describes herself to be very friendly, talkative, spiritual, a people pleaser.  She describes her Mandaen to be Buddhism.      REVIEW OF SYSTEMS:  No headaches, no vision changes, no shortness of breath, chest pain, nausea, vomiting, diarrhea.  No upper or lower extremity weakness or numbness.        FEMALE HISTORY:  She started menstruating at the age of 15.  She is not pregnant.  She would like to get pregnant in the  future.      EXAMINATION /75 (BP Location: Right arm, Patient Position: Chair, Cuff Size: Adult Regular)   Pulse 81   Wt 61.7 kg (136 lb)   Breastfeeding? No      Wt Readings from Last 4 Encounters:   05/16/19 136 lb (61.7 kg)   12/20/18 142 lb 6.4 oz (64.6 kg)   06/07/13 136 lb 9.6 oz (62 kg) (75 %)*     * Growth percentiles are based on [facility] (Girls, 2-20 Years) data.       Alert, orientated, speech is fluent, pupils are equal, round, and reactive to light, face symmetric, no pronator drip, equal  strength, reflexes are symmetric, normal to light touch with no sensory deficits noted, finger to nose normal, no focal deficits noted.Gait is stable. Able to tandem gait.     Alert, orientated, speech is fluent, face is symmetric, extra-ocular movement in tact, no focal deficits noted.Gait is stable.      ASSESSMENT:  A 23-year-old female who presents with recurrent seizure-like spells.  These spells are described as an aura, vision changes followed by whole body stiffening with low amplitude trembling and repetitive clicking noises.  I suspect the spell as a generalized tonic-clonic seizure.  This typically happens mainly at nighttime.  Age of onset was 17.  EEG today was notable for a burst of sharply contoured delta activity in the awake state.  It is difficult to decipher if these are generalized epileptiform discharges or a burst of delta slowing that are simply sharply contoured and representing a mild encephalopathy.  I suspect she does have epilepsy and just not sure if it is generalized or partial epilepsy.  Her aura suggests perhaps it may be a partial epilepsy.  However, with the age of onset, we should consider that this is a juvenile onset generalized epilepsy.  She does not have features consistent with juvenile myoclonic epilepsy, however.  I think it is best that we use broad-spectrum agent.  I would recommend that we start lamotrigine and wean her off of oxcarbazepine.  Additionally, she is  not able to tolerate oxcarbazepine because her sodium dropped to 125.  Perhaps a combination of lamotrigine and zonisamide may be a beneficial combination for her.  Additionally, other medications that are broad-spectrum that may be considered in the future are Depakote (if on birth control), topiramate (if on birth control), Fycompa, Vimpat; not in this particular order.      At this time, I do not think it would be helpful to do inpatient video EEG monitoring because her spells are 1-2 times per year, and I suspect that we might not capture anything.  She did have a 1-week evaluation at [place] that was unrevealing.  I recommend we focus her energy on optimizing her anti-seizure medications.  The patient was advised not to drive and follow seizure precautions.      PLAN:   1.  Start lamotrigine.   2.  Continue zonisamide and oxcarbazepine.   3.  Do not drive.   4.  Check lamotrigine in 3 months.   5.  Nurse call in 2 weeks to check on how she is tolerating initiation of lamotrigine.   6.  Follow with Dr. Nuñez in 3 months.       Times of Days           Medication Tablet Size Number of Tablets/Capsules Notes      Lamotrigine      9 AM after breakfast  (Morning)   5 PM after dinner  (Night)        Week 1       25 mg  (1 tablet)      0 mg    Lamotrigine 25 mg tablet     Week 2       25 mg   (1 tablet)    25 mg   (1 tablet)   Lamotrigine 25 mg tablet    Week 3      50 mg   (2 tablets)   25 mg   (1 tablet)   Lamotrigine 25 mg tablet    Week 4      50 mg   (2 tablets)   50 mg   (2 tablets)   Lamotrigine 25 mg tablet    Week 5     75 mg   (3 tablets)   50 mg   (2 tablets)   Lamotrigine 25 mg tablet    Week 6      75 mg   (3 tablets)   75 mg   (3 tablets)   Lamotrigine 25 mg tablet   Week  7   100 mg   (4 tablets)  75 mg   (3 tablets)  Lamotrigine 25 mg tablet   Week 8   100 mg   (4 tablets)  100 mg   (4 tablets)   lamotrigine 100 mg tablet    Week 9   125 mg   (5 tablets)  100 mg   (4 tablets)  Lamotrigine 25 mg and 100  mg tablets   Week 10   150 mg     100 mg    Lamotrigine 25 mg and 100 mg tablets   Week 11   175 mg     100 mg     Lamotrigine 25 mg and 100 mg tablets   Week 12   200 mg     100 mg     Lamotrigine 25 mg and 100 mg tablets     Monitor for side effects, especially rash and mood changes, if any concerns please call our office. [phone]    Estrogen can affect lamotrigine     Continue oxcarbazepine 600 -900 and zonisamide 100 mg at night     Thank you for allowing us to participate in Ms. Ana Paula Ramirez's care.       Kellee Nuñez MD     cc:   Sera Barajas MD    [facility]   [address] [place] [address]       I spent 60 minutes with the patient. During this time counseling and coordination of care exceeded 50% of the face to face visit time. I addressed all questions the patient/caregiver raised in regards to the patient's medical care.

## 2019-05-16 NOTE — PROGRESS NOTES
[facility]    Patient:  Ana Paula Rosenberg  :  1995   Age:  23 year old   Today's Office Visit:  2019    Epilepsy Data:  First seizure at the age of 17.  Her first seizure occur approximately around 2013.  She was at a friend's house and out of sleep, had what was described as a seizure.  Her whole body was stiff with shivering, low amplitude trembling and the repetitive clicking noise.  She had another one on 2013, again this was out of sleep and subsequently she had an EEG which was normal.      Over the years, she has had a total of 8 seizures and it seems that she averages approximately 1-2 per year.  She was initially treated with levetiracetam around .  Max dose was 1500 mg per day and that was not entirely helpful, because she had severe side effects, mainly of severe depression and severe irritability.  She states that she was fighting with other people a lot.  She was then transitioned at the age of 19 to oxcarbazepine, which has been gradually increased over the years to current dose.  However, the patient did have an episode of hyponatremia with sodium of 125 in 2018 and her dose was lowered a little bit by Dr. Barajas and zonisamide 100 mg was added.  She states that her last 2 seizures were in 2018 and in 10/2018.  Her oxcarbazepine maximum dose has been up to 17 as of 10/03/2018 and we do not have a zonisamide level.       SEIZURE TYPES:     Seizure type 1, she states she feels as though she is going in and out with her vision.  She describes it as though her contacts are not in her eye and her vision just feels off.  This lasts approximately 2 minutes.  She is often able to get into a secure place and sit down so that way she does not get hurt.  She did have 1 seizure in the evening at work, but the majority of the seizures are at nighttime she states.  This is followed by loss of consciousness and awareness, whole body stiffening, her eyes  go back and she is low amplitude trembling and the clicking noise in her throat.  She has not bitten her tongue.  No urinary incontinence or no significant trauma with these specific seizures, but again, she does get herself into a safe herself to prevent bodily injury.  When asked if she has early morning jerks, she denies that.  Her mom has not witnessed that she stares off into space or is unresponsive.  The patient denies having recurrent episodes of auras.      Interval History:     Last seizure was 10/2018 ( generalized tonic-clonic convulsion) and prior to 2018 and prior to this she had one generalized tonic-clonic convulsion per year.     TRIGGERS:  When asked, she thinks that seizures are triggered by fatigue, lack of sleep, when blood sugar is low, dehydration, menstruation, failure to take medications.      EPILEPSY-RELATED RISK FACTORS:  No history of encephalitis, meningitis, strokes or brain tumors.  She was born via emergent .  She was 6 pounds 4 ounces.  No history of developmental delay.  She was walking and talking at the right age.  No febrile seizures.  No family history of epilepsy.        Past medical testing 2018, she had video EEG for approximately 1 week and the reports all within normal limits.  She states that her seizure medications were reduced.  EEGs in 2013, I not able to access those records.  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  This was 2013.  Per the report; I do not have the actual images on file.  We did not have imaging completed here at [facility].  EEG at [facility] in  was normal.  Laboratory tests, highest level of oxcarbazepine in 10/2018 was 17.     Prior to Admission medications    Medication Sig Start Date End Date Taking? Authorizing Provider   Ibuprofen (ADVIL PO) Take  by mouth as needed.   Yes Reported, Patient   lamoTRIgine (LAMICTAL) 100 MG tablet  200 mg am and 100 mg pm 1/20/19  Yes Kellee Nuñez MD   lamoTRIgine (LAMICTAL) 25 MG tablet Titrate to 200 mg am and 100 mg pm (use in combination with 100mg tabs) 3/11/19  Yes Kellee Nuñez MD   OXcarbazepine (TRILEPTAL) 300 MG tablet Take 600mg in AM and 900mg in PM 11/12/18  Yes Reported, Patient   zonisamide (ZONEGRAN) 100 MG capsule Take 200 mg by mouth 11/12/18 11/17/19 Yes Reported, Patient   pseudoePHEDrine (SUDAFED) 30 MG tablet Take by mouth every 4 hours as needed for congestion    Reported, Patient       Lamotrigine 200-100  Zonisamide 100 mg pm   Oxcarbazepine 300-300       PAST MEDICAL HISTORY:  Attention deficit disorder.  She stopped taking Vyvanse a year ago because of concerns that it can cause seizures.        SOCIAL HISTORY:  She has no history of events,  physical, verbal or sexual trauma.  She had a stable upbringing and supportive family.  She is currently in college for [employer].  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  She is not .  She drinks 2-3 drinks a couple of times per month.  Does not smoke, does not do recreational drugs.  She has coffee intermittently.       PSYCHOLOGICAL HISTORY:  She currently does not have any depressive symptoms.  She does have a little bit of anxiety.  She describes herself to be very friendly, talkative, spiritual, a people pleaser.  She describes her Adventism to be Hinduism.      REVIEW OF SYSTEMS:  No headaches, no vision changes, no shortness of breath, chest pain, nausea, vomiting, diarrhea.  No upper or lower extremity weakness or numbness.        FEMALE HISTORY:  She started menstruating at the age of 15.  She is not pregnant.  She would like to get pregnant in the future.      EXAMINATION /75 (BP Location: Right arm, Patient Position: Chair, Cuff Size: Adult Regular)   Pulse 81   Wt 61.7 kg (136 lb)   Breastfeeding? No      Wt Readings from Last 4 Encounters:   05/16/19 136 lb (61.7 kg)   12/20/18 142 lb 6.4 oz (64.6 kg)    06/07/13 136 lb 9.6 oz (62 kg) (75 %)*     * Growth percentiles are based on [facility] (Girls, 2-20 Years) data.       Alert, orientated, speech is fluent, pupils are equal, round, and reactive to light, face symmetric, no pronator drip, equal  strength, reflexes are symmetric, normal to light touch with no sensory deficits noted, finger to nose normal, no focal deficits noted.Gait is stable. Able to tandem gait.     Alert, orientated, speech is fluent, face is symmetric, extra-ocular movement in tact, no focal deficits noted.Gait is stable.      ASSESSMENT:  A 23-year-old female who presents with recurrent seizure-like spells.  These spells are described as an aura, vision changes followed by whole body stiffening with low amplitude trembling and repetitive clicking noises.  I suspect the spell as a generalized tonic-clonic seizure.  This typically happens mainly at nighttime.  Age of onset was 17.  EEG today was notable for a burst of sharply contoured delta activity in the awake state.  It is difficult to decipher if these are generalized epileptiform discharges or a burst of delta slowing that are simply sharply contoured and representing a mild encephalopathy.  I suspect she does have epilepsy and just not sure if it is generalized or partial epilepsy.  Her aura suggests perhaps it may be a partial epilepsy.  However, with the age of onset, we should consider that this is a juvenile onset generalized epilepsy.  She does not have features consistent with juvenile myoclonic epilepsy, however.  I think it is best that we use broad-spectrum agent.  I would recommend that we start lamotrigine and wean her off of oxcarbazepine.  Additionally, she is not able to tolerate oxcarbazepine because her sodium dropped to 125.  Perhaps a combination of lamotrigine and zonisamide may be a beneficial combination for her.  Additionally, other medications that are broad-spectrum that may be considered in the future are Depakote  (if on birth control), topiramate (if on birth control), Fycompa, Vimpat; not in this particular order.      At this time, I do not think it would be helpful to do inpatient video EEG monitoring because her spells are 1-2 times per year, and I suspect that we might not capture anything.  She did have a 1-week evaluation at [place] that was unrevealing.  I recommend we focus her energy on optimizing her anti-seizure medications.  The patient was advised not to drive and follow seizure precautions.      PLAN:   1.  Start lamotrigine.   2.  Continue zonisamide and oxcarbazepine.   3.  Do not drive.   4.  Check lamotrigine in 3 months.   5.  Nurse call in 2 weeks to check on how she is tolerating initiation of lamotrigine.   6.  Follow with Dr. Nuñez in 3 months.              Times of Days           Medication Tablet Size Number of Tablets/Capsules Notes      Lamotrigine      9 AM after breakfast  (Morning)   5 PM after dinner  (Night)        Week 1       25 mg  (1 tablet)      0 mg    Lamotrigine 25 mg tablet     Week 2       25 mg   (1 tablet)    25 mg   (1 tablet)   Lamotrigine 25 mg tablet    Week 3      50 mg   (2 tablets)   25 mg   (1 tablet)   Lamotrigine 25 mg tablet    Week 4      50 mg   (2 tablets)   50 mg   (2 tablets)   Lamotrigine 25 mg tablet    Week 5     75 mg   (3 tablets)   50 mg   (2 tablets)   Lamotrigine 25 mg tablet    Week 6      75 mg   (3 tablets)   75 mg   (3 tablets)   Lamotrigine 25 mg tablet   Week  7   100 mg   (4 tablets)  75 mg   (3 tablets)  Lamotrigine 25 mg tablet   Week 8   100 mg   (4 tablets)  100 mg   (4 tablets)   lamotrigine 100 mg tablet    Week 9   125 mg   (5 tablets)  100 mg   (4 tablets)  Lamotrigine 25 mg and 100 mg tablets   Week 10   150 mg     100 mg    Lamotrigine 25 mg and 100 mg tablets   Week 11   175 mg     100 mg     Lamotrigine 25 mg and 100 mg tablets   Week 12   200 mg     100 mg     Lamotrigine 25 mg and 100 mg tablets     Monitor for side effects, especially  rash and mood changes, if any concerns please call our office. [phone]    Estrogen can affect lamotrigine     Continue oxcarbazepine 600 -900 and zonisamide 100 mg at night       Thank you for allowing us to participate in Ms. Ana Paula Ramirez's care.        cc:   Sera Barajas MD    [facility]   [address] [place] [address]         I spent 60 minutes with the patient. During this time counseling and coordination of care exceeded 50% of the face to face visit time. I addressed all questions the patient/caregiver raised in regards to the patient's medical care.     Kellee Nuñez MD

## 2019-07-30 ENCOUNTER — OFFICE VISIT (OUTPATIENT)
Dept: NEUROLOGY | Facility: CLINIC | Age: 24
End: 2019-07-30
Payer: COMMERCIAL

## 2019-07-30 VITALS — SYSTOLIC BLOOD PRESSURE: 118 MMHG | WEIGHT: 135 LBS | HEART RATE: 83 BPM | DIASTOLIC BLOOD PRESSURE: 79 MMHG

## 2019-07-30 DIAGNOSIS — G40.319 GENERALIZED CONVULSIVE EPILEPSY WITH INTRACTABLE EPILEPSY (H): ICD-10-CM

## 2019-07-30 DIAGNOSIS — R56.9 SEIZURES (H): Primary | ICD-10-CM

## 2019-07-30 RX ORDER — ZONISAMIDE 100 MG/1
CAPSULE ORAL
Qty: 120 CAPSULE | Refills: 11 | Status: SHIPPED | OUTPATIENT
Start: 2019-07-30 | End: 2020-01-30

## 2019-07-30 RX ORDER — LAMOTRIGINE 100 MG/1
TABLET ORAL
Qty: 120 TABLET | Refills: 11 | Status: SHIPPED | OUTPATIENT
Start: 2019-07-30 | End: 2019-07-30

## 2019-07-30 RX ORDER — LAMOTRIGINE 100 MG/1
TABLET ORAL
Qty: 90 TABLET | Refills: 11 | Status: SHIPPED | OUTPATIENT
Start: 2019-07-30 | End: 2019-12-16

## 2019-07-30 ASSESSMENT — PAIN SCALES - GENERAL: PAINLEVEL: NO PAIN (0)

## 2019-07-30 NOTE — PROGRESS NOTES
[facility]    Patient:  Ana Paula Rosenberg  :  1995   Age:  23 year old   Today's Office Visit:  2019      Epilepsy Data:  First seizure at the age of 17.  Her first seizure occur approximately around 2013.  She was at a friend's house and out of sleep, had what was described as a seizure.  Her whole body was stiff with shivering, low amplitude trembling and the repetitive clicking noise.  She had another one on 2013, again this was out of sleep and subsequently she had an EEG which was normal.  total of 8 seizures and it seems that she averages approximately 1-2 per year.  She was initially treated with levetiracetam around .  Max dose was 1500 mg per day and that was not entirely helpful, because she had severe side effects, mainly of severe depression and severe irritability.  She states that she was fighting with other people a lot.  She was then transitioned at the age of 19 to oxcarbazepine, which has been gradually increased over the years to current dose.  However, the patient did have an episode of hyponatremia with sodium of 125 in 2018 and her dose was lowered a little bit by Dr. Barajas and zonisamide 100 mg was added.  She states that her last 2 seizures were in 2018 and in 10/2018.  Her oxcarbazepine maximum dose has been up to 17 as of 10/03/2018 and we do not have a zonisamide level.  seizures are triggered by fatigue, lack of sleep, when blood sugar is low, dehydration, menstruation, failure to take medications.     Seizure type 1, she states she feels as though she is going in and out with her vision.  She describes it as though her contacts are not in her eye and her vision just feels off.  This lasts approximately 2 minutes.  She is often able to get into a secure place and sit down so that way she does not get hurt.  She did have 1 seizure in the evening at work, but the majority of the seizures are at nighttime she states.  This is  followed by loss of consciousness and awareness, whole body stiffening, her eyes go back and she is low amplitude trembling and the clicking noise in her throat.  She has not bitten her tongue.  No urinary incontinence or no significant trauma with these specific seizures, but again, she does get herself into a safe herself to prevent bodily injury.  When asked if she has early morning jerks, she denies that.  Her mom has not witnessed that she stares off into space or is unresponsive.  The patient denies having recurrent episodes of auras.      Interval History: She came with her mother today. Last seizure was 10/2018 ( generalized tonic-clonic convulsion) and prior to 8/2018 and prior to this she had one generalized tonic-clonic convulsion per year.  Since her last visit she has reduced her oxcarbazepine.  I do not recall giving her instructions to do this.  Nonetheless she has not had any seizures and is doing well on lamotrigine and zonisamide and 1 tablet of oxcarbazepine at night.  She has lost nearly 20 pounds on the zonisamide.  She has to eat every 3 hours to keep her weight.  Overall she is very happy with this not want to change her seizure medications.  She has not had any emergency room visits or hospitalizations since I last saw her.  She has some fatigue.  She denies having dizziness, no double vision, no abdominal pain, no falls, no ataxia.  We talked about healthy diet, attention deficit disorder and Ritalin and epilepsy, seizure medication modifications on this visit.      Current antiepileptic drug              Medication Name   Tablet Size         8 AM  (morning)   4pm  9 PM (Night)   Notes    Lamotrigine 100 mg   2.5 tablet    1 tablet       Zonisamide 100 mg    3 capsules          Oxcarbazepine   0 tablet    1 tablet        SOCIAL HISTORY:  She has no history of events,  physical, verbal or sexual trauma.  She had a stable upbringing and supportive family.  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  She is not .  She drinks 2-3 drinks a couple of times per month.  Does not smoke, does not do recreational drugs.  She has coffee intermittently.       PSYCHOLOGICAL HISTORY:  She currently does not have any depressive symptoms.  She does have a little bit of anxiety.  She describes herself to be very friendly, talkative, spiritual, a people pleaser.  She describes her Yazidi to be Rastafarian.      REVIEW OF SYSTEMS:  No headaches, no vision changes, no shortness of breath, chest pain, nausea, vomiting, diarrhea.  No upper or lower extremity weakness or numbness.        FEMALE HISTORY:  She started menstruating at the age of 15.  She is not pregnant.  She would like to get pregnant in the future.      EXAMINATION /79 (BP Location: Right arm, Patient Position: Sitting, Cuff Size: Adult Regular)   Pulse 83   Wt 135 lb (61.2 kg)      Wt Readings from Last 4 Encounters:   07/30/19 135 lb (61.2 kg)   05/16/19 136 lb (61.7 kg)   12/20/18 142 lb 6.4 oz (64.6 kg)   06/07/13 136 lb 9.6 oz (62 kg) (75 %)*     * Growth percentiles are based on CDC (Girls, 2-20 Years) data.       Alert, orientated, speech is fluent, pupils are equal, round, and reactive to light, face symmetric, no pronator drip, equal  strength, reflexes are symmetric, normal to light touch with no sensory deficits noted, finger to nose normal, no focal deficits noted.Gait is stable. Able to tandem gait.     Alert, orientated, speech is fluent, face is symmetric, extra-ocular movement in tact, no focal deficits noted.Gait is stable.      ASSESSMENT:  A 24-year-old female who presents with recurrent seizure-like spells.  These spells are described as an aura, vision changes followed by whole body stiffening with low amplitude trembling and repetitive clicking noises.  I suspect the spell as a generalized tonic-clonic seizure which usually occur at  nighttime. Age of seizure onset was 17.  EEG in 2018 was notable for a burst of sharply contoured delta activity in the awake state.  It is difficult to decipher if these are generalized epileptiform discharges or a burst of delta slowing that are simply sharply contoured and representing a mild encephalopathy.  I suspect she does have epilepsy and just not sure if it is generalized or partial epilepsy.  Her aura suggests perhaps it may be a partial epilepsy.  She does not have features consistent with juvenile myoclonic epilepsy, however.  I think it is best that we use broad-spectrum agent.      She is tolerating lamotrigine and zonisamide combination well.  She has lost weight on zonisamide.  She has some side effects and fatigue also.  Overall we will continue to optimize lamotrigine in the morning to 300 mg.  I have advised her to stop taking her oxcarbazepine.  We will check her lamotrigine and zonisamide level.  If she has additional levels we may consider optimizing these 2 medications.  Additionally, other medications that are broad-spectrum that may be considered in the future are Depakote (if on birth control), topiramate (if on birth control), Fycompa, Vimpat; not in this particular order.      Patient was agreeable with this plan of care.    PLAN:                Medication Name   Tablet Size         8 AM  (morning)   4pm  9 PM (Night)   Notes    Lamotrigine 100 mg   3 tablet    1 tablet       Zonisamide 100 mg      3 capsules        Oxcarbazepine   0 tablet    0 tablet          Goal lamotrigine level is <14 and zonisamide <40    Increase lamotrigine and stop oxcarbazepine. Eat a large breakfast to avoid side effects.     Follow up  6 months   Check antiepileptic drug for efficacy, toxicity, and side effects.    Changed pharmacy to pill pack      Thank you for allowing us to participate in Ms. Ana Paula Ramirez's care.        cc:   Sera Barajas MD    [facility]   [address] [facility] [place] [address]         I spent 40 minutes with the patient. During this time counseling and coordination of care exceeded 50% of the face to face visit time. I addressed all questions the patient/caregiver raised in regards to the patient's medical care.     Kellee Nuñez MD

## 2019-07-30 NOTE — PATIENT INSTRUCTIONS
Medication Name   Tablet Size         8 AM  (morning)   4pm  9 PM (Night)   Notes    Lamotrigine 100 mg   3 tablet    1 tablet       Zonisamide 100 mg      3 capsules        Oxcarbazepine   0 tablet    0 tablet          Goal lamotrigine level is <14 and zonisamide <40    Increase lamotrigine and stop oxcarbazepine. Eat a large breakfast to avoid side effects.             Phone number: [phone] (24 hours/day, 7 days/week)       Fax: [phone]      e-scribe: [facility]      Website: [email or web address]         How it works: [facility] sends a 14-day supply of medications,       divided into daily packets            Payment & Billing: There s no additional cost for using [facility].       You ll pay your standard 30-day copays for our service. If you are       currently receiving 90-day supplies, you may pay a slightly higher copay       with [facility]. [employer] is compatible with most insurance plans.    Dispense controlled medications? CIII-CV but no CII prescriptions.   Shipping: ships to [address] [place] [address] (not [place] or [place]). And can accommodate vacation supplies.   What about PRN medications or non-pill formulations? Medications prescribed  as-needed  may be dispensed in traditional prescription bottles rather than in your daily packs. We dispense inhalers, creams, testing supplies, and other pharmacy items, as well as prescriptions, over-the-counter medications, and vitamins. We also ship refrigerated medication, like insulin.  Caregiving tools: Access all of your loved one s medication and billing information.   Does [facility] have an william? [facility] offers our customers an william for [employer] that allows you to view and manage your [facility] medications, message your pharmacist if you have a question, track your shipments, and get reminders at your packet times. You can download it on the [employer].           Kellee Nuñez MD

## 2019-07-30 NOTE — LETTER
2019     RE: Ana Paula Rosenberg  : 1995   MRN: [id number]      Dear Colleague,    Thank you for referring your patient, Ana Paula Rosenberg, to the [facility] EPILEPSY [facility]ARE at [facility]. Please see a copy of my visit note below.    [facility] [place] [facility] Progress Note    Patient:  Ana Paula Rosenberg  :  1995   Age:  23 year old   Today's Office Visit:  2019      Epilepsy Data:  First seizure at the age of 17.  Her first seizure occur approximately around 2013.  She was at a friend's house and out of sleep, had what was described as a seizure.  Her whole body was stiff with shivering, low amplitude trembling and the repetitive clicking noise.  She had another one on 2013, again this was out of sleep and subsequently she had an EEG which was normal.  total of 8 seizures and it seems that she averages approximately 1-2 per year.  She was initially treated with levetiracetam around .  Max dose was 1500 mg per day and that was not entirely helpful, because she had severe side effects, mainly of severe depression and severe irritability.  She states that she was fighting with other people a lot.  She was then transitioned at the age of 19 to oxcarbazepine, which has been gradually increased over the years to current dose.  However, the patient did have an episode of hyponatremia with sodium of 125 in 2018 and her dose was lowered a little bit by Dr. Barajas and zonisamide 100 mg was added.  She states that her last 2 seizures were in 2018 and in 10/2018.  Her oxcarbazepine maximum dose has been up to 17 as of 10/03/2018 and we do not have a zonisamide level.  seizures are triggered by fatigue, lack of sleep, when blood sugar is low, dehydration, menstruation, failure to take medications.     Seizure type 1, she states she feels as though she is going in and out with her vision.  She describes it as though her contacts are not in her eye and her  vision just feels off.  This lasts approximately 2 minutes.  She is often able to get into a secure place and sit down so that way she does not get hurt.  She did have 1 seizure in the evening at work, but the majority of the seizures are at nighttime she states.  This is followed by loss of consciousness and awareness, whole body stiffening, her eyes go back and she is low amplitude trembling and the clicking noise in her throat.  She has not bitten her tongue.  No urinary incontinence or no significant trauma with these specific seizures, but again, she does get herself into a safe herself to prevent bodily injury.  When asked if she has early morning jerks, she denies that.  Her mom has not witnessed that she stares off into space or is unresponsive.  The patient denies having recurrent episodes of auras.      Interval History: She came with her mother today. Last seizure was 10/2018 ( generalized tonic-clonic convulsion) and prior to 8/2018 and prior to this she had one generalized tonic-clonic convulsion per year.  Since her last visit she has reduced her oxcarbazepine.  I do not recall giving her instructions to do this.  Nonetheless she has not had any seizures and is doing well on lamotrigine and zonisamide and 1 tablet of oxcarbazepine at night.  She has lost nearly 20 pounds on the zonisamide.  She has to eat every 3 hours to keep her weight.  Overall she is very happy with this not want to change her seizure medications.  She has not had any emergency room visits or hospitalizations since I last saw her.  She has some fatigue.  She denies having dizziness, no double vision, no abdominal pain, no falls, no ataxia.  We talked about healthy diet, attention deficit disorder and Ritalin and epilepsy, seizure medication modifications on this visit.      Current antiepileptic drug              Medication Name   Tablet Size          8 AM  (morning)   4pm  9 PM (Night)   Notes    Lamotrigine 100 mg   2.5 tablet     1 tablet       Zonisamide 100 mg    3 capsules          Oxcarbazepine   0 tablet    1 tablet        SOCIAL HISTORY:  She has no history of events,  physical, verbal or sexual trauma.  She had a stable upbringing and supportive family.  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  She is not .  She drinks 2-3 drinks a couple of times per month.  Does not smoke, does not do recreational drugs.  She has coffee intermittently.       PSYCHOLOGICAL HISTORY:  She currently does not have any depressive symptoms.  She does have a little bit of anxiety.  She describes herself to be very friendly, talkative, spiritual, a people pleaser.  She describes her Restoration to be Episcopal.      REVIEW OF SYSTEMS:  No headaches, no vision changes, no shortness of breath, chest pain, nausea, vomiting, diarrhea.  No upper or lower extremity weakness or numbness.        FEMALE HISTORY:  She started menstruating at the age of 15.  She is not pregnant.  She would like to get pregnant in the future.      EXAMINATION /79 (BP Location: Right arm, Patient Position: Sitting, Cuff Size: Adult Regular)   Pulse 83   Wt 135 lb (61.2 kg)      Wt Readings from Last 4 Encounters:   07/30/19 135 lb (61.2 kg)   05/16/19 136 lb (61.7 kg)   12/20/18 142 lb 6.4 oz (64.6 kg)   06/07/13 136 lb 9.6 oz (62 kg) (75 %)*     * Growth percentiles are based on CDC (Girls, 2-20 Years) data.       Alert, orientated, speech is fluent, pupils are equal, round, and reactive to light, face symmetric, no pronator drip, equal  strength, reflexes are symmetric, normal to light touch with no sensory deficits noted, finger to nose normal, no focal deficits noted.Gait is stable. Able to tandem gait.     Alert, orientated, speech is fluent, face is symmetric, extra-ocular movement in tact, no focal deficits noted.Gait is stable.      ASSESSMENT:  A 24-year-old female who presents with  recurrent seizure-like spells.  These spells are described as an aura, vision changes followed by whole body stiffening with low amplitude trembling and repetitive clicking noises.  I suspect the spell as a generalized tonic-clonic seizure which usually occur at nighttime. Age of seizure onset was 17.  EEG in 2018 was notable for a burst of sharply contoured delta activity in the awake state.  It is difficult to decipher if these are generalized epileptiform discharges or a burst of delta slowing that are simply sharply contoured and representing a mild encephalopathy.  I suspect she does have epilepsy and just not sure if it is generalized or partial epilepsy.  Her aura suggests perhaps it may be a partial epilepsy.  She does not have features consistent with juvenile myoclonic epilepsy, however.  I think it is best that we use broad-spectrum agent.      She is tolerating lamotrigine and zonisamide combination well.  She has lost weight on zonisamide.  She has some side effects and fatigue also.  Overall we will continue to optimize lamotrigine in the morning to 300 mg.  I have advised her to stop taking her oxcarbazepine.  We will check her lamotrigine and zonisamide level.  If she has additional levels we may consider optimizing these 2 medications.  Additionally, other medications that are broad-spectrum that may be considered in the future are Depakote (if on birth control), topiramate (if on birth control), Fycompa, Vimpat; not in this particular order.      Patient was agreeable with this plan of care.    PLAN:                Medication Name   Tablet Size         8 AM  (morning)   4pm  9 PM (Night)   Notes    Lamotrigine 100 mg   3 tablet    1 tablet       Zonisamide 100 mg      3 capsules        Oxcarbazepine   0 tablet    0 tablet          Goal lamotrigine level is <14 and zonisamide <40    Increase lamotrigine and stop oxcarbazepine. Eat a large breakfast to avoid side effects.     Follow up  6 months    Check antiepileptic drug for efficacy, toxicity, and side effects.    Changed pharmacy to pill pack      Thank you for allowing us to participate in Ms. Ana Paula Ramirez's care.        cc:   Sera Barajas MD    [address] [place] [address]         I spent 40 minutes with the patient. During this time counseling and coordination of care exceeded 50% of the face to face visit time. I addressed all questions the patient/caregiver raised in regards to the patient's medical care.     Kellee Nuñez MD

## 2019-08-08 ENCOUNTER — TELEPHONE (OUTPATIENT)
Dept: NEUROLOGY | Facility: CLINIC | Age: 24
End: 2019-08-08

## 2019-08-08 NOTE — TELEPHONE ENCOUNTER
What is the concern that needs to be addressed by a nurse? Expecting a call back since yesterday about medication questions.     May a detailed message be left on voicemail? yes    Date of last office visit:     Message routed to: [facility] rn pool

## 2019-08-08 NOTE — TELEPHONE ENCOUNTER
Ana Paula saw Dr. Nuñez last Tuesday. While visiting, she mentioned stopping Trileptal at night.     Mom would like to confirm current medication dosages:    As discussed with Dr. Nuñez, Lamotrigine was increased from 250 mg AM to 300 mg QAM / continue 100 mg at HS.    Daily dose of Zonisamide is 300 mg HS.    Continue Sudafed and Ibuprofen PRN.    Encouraged mom to call back if additional questions.

## 2019-10-01 ENCOUNTER — TELEPHONE (OUTPATIENT)
Dept: NEUROLOGY | Facility: CLINIC | Age: 24
End: 2019-10-01

## 2019-10-01 NOTE — TELEPHONE ENCOUNTER
What is the concern that needs to be addressed by a nurse? Wants to discuss seizure medications and if they will be ok when pt has her tooth removed.     May a detailed message be left on voicemail? yes    Date of last office visit:     Message routed to: [facility] rn pool

## 2019-10-01 NOTE — TELEPHONE ENCOUNTER
Patient's mother calls the clinic to ask about wisdom tooth removal. Day of the procedure is the 16th by Dr. Keller ph. [phone]. The patient has her consult there on Oct 8th. They will require Dr. Nuñez's approval for the surgery.     I called Dr. Keller's office to inquire about what they will need from us. The office states that there is nothing to be done now and this will be determined at the Oct 8th consult. They will contact us at that time if needed.

## 2019-10-03 RX ORDER — LAMOTRIGINE 25 MG/1
TABLET ORAL
Qty: 60 TABLET | Refills: 1 | OUTPATIENT
Start: 2019-10-03

## 2019-10-24 ENCOUNTER — TELEPHONE (OUTPATIENT)
Dept: NEUROLOGY | Facility: CLINIC | Age: 24
End: 2019-10-24

## 2019-10-24 NOTE — TELEPHONE ENCOUNTER
What is the concern that needs to be addressed by a nurse? Patient mother would like a call back regarding medication questions.Please call mom back.    May a detailed message be left on voicemail?     Date of last office visit: 07/30/2019    Message routed to: [facility] RN POOL

## 2019-10-24 NOTE — TELEPHONE ENCOUNTER
Mom calls to express concern regarding Thang medication dosage.  Ana Paula is currently taking Lamictal (100) 300 mg QAM. She was also prescribed Lamictal 100 mg at HS which she has not been taking.    In addition, she is prescribed Zonegran 300 mg at HS.

## 2019-10-25 RX ORDER — LAMOTRIGINE 25 MG/1
TABLET ORAL
Qty: 60 TABLET | Refills: 1 | OUTPATIENT
Start: 2019-10-25

## 2019-10-25 NOTE — TELEPHONE ENCOUNTER
Discussed with Dr. Nuñez:    Ana Paula will administer Lamotrigine 300 mg AM / 50 mg HS x 2 weeks, then increase to 300 mg AM / 100 mg PM and continue this dose.    Continue Zonegran (100) 300 mg at HS.    This information was conveyed to Ana Paula's mom who is agreeable to the plan.

## 2019-10-25 NOTE — TELEPHONE ENCOUNTER
Writer spoke with Marti ([place] mom). Marti would like medications transferred to [facility]. Ana Paula has a one month supply of medication. Will transfer medications near the end of the one month supply.

## 2019-11-20 ENCOUNTER — TELEPHONE (OUTPATIENT)
Dept: NEUROLOGY | Facility: CLINIC | Age: 24
End: 2019-11-20

## 2019-11-20 NOTE — TELEPHONE ENCOUNTER
What is the concern that needs to be addressed by a nurse? Patient mom is calling and would like to know if it will be safe for patient to take hydrocodone after her dental procedure she is having done today while she is taking her seizure meds.Please call mom back ASAP.    May a detailed message be left on voicemail? yes    Date of last office visit: 07/30/2019    Message routed to:[facility] RN POOL

## 2019-11-20 NOTE — TELEPHONE ENCOUNTER
Discussed with Juan Araiza to provide hydrocodone to Ana Paula for mouth pain x 3 days.   Then discontinue this medication.

## 2019-11-20 NOTE — TELEPHONE ENCOUNTER
Mom called back - read Marlena notes to mom - she was agreeable - also let mom know that if she has anymore questions she can call back.

## 2019-12-16 DIAGNOSIS — G40.319 GENERALIZED CONVULSIVE EPILEPSY WITH INTRACTABLE EPILEPSY (H): Primary | ICD-10-CM

## 2019-12-16 DIAGNOSIS — R56.9 SEIZURES (H): ICD-10-CM

## 2019-12-16 RX ORDER — LAMOTRIGINE 100 MG/1
TABLET ORAL
Qty: 90 TABLET | Refills: 3 | Status: SHIPPED | OUTPATIENT
Start: 2019-12-16 | End: 2020-01-30

## 2019-12-16 RX ORDER — LAMOTRIGINE 100 MG/1
TABLET ORAL
Qty: 90 TABLET | Refills: 3 | Status: SHIPPED | OUTPATIENT
Start: 2019-12-16 | End: 2019-12-16

## 2019-12-16 RX ORDER — LAMOTRIGINE 100 MG/1
TABLET ORAL
Qty: 120 TABLET | Refills: 1 | Status: SHIPPED | OUTPATIENT
Start: 2019-12-16 | End: 2020-01-30

## 2019-12-16 NOTE — PROGRESS NOTES
Mom calls to clarify dose of medication on behalf of Ana Paula.  Per Chart review, Ana Paula should be taking Lamictal 300-100. She also takes Zonisamide.  Will FAX refill order to [facility] in [place] per mom's request. FAX number: [phone]

## 2020-01-30 ENCOUNTER — OFFICE VISIT (OUTPATIENT)
Dept: NEUROLOGY | Facility: CLINIC | Age: 25
End: 2020-01-30
Payer: COMMERCIAL

## 2020-01-30 VITALS — SYSTOLIC BLOOD PRESSURE: 117 MMHG | WEIGHT: 130 LBS | DIASTOLIC BLOOD PRESSURE: 81 MMHG | HEART RATE: 93 BPM

## 2020-01-30 DIAGNOSIS — G40.319 GENERALIZED CONVULSIVE EPILEPSY WITH INTRACTABLE EPILEPSY (H): Primary | ICD-10-CM

## 2020-01-30 RX ORDER — FOLIC ACID 1 MG/1
2 TABLET ORAL DAILY
Qty: 180 TABLET | Refills: 3 | Status: SHIPPED | OUTPATIENT
Start: 2020-01-30 | End: 2021-07-09

## 2020-01-30 RX ORDER — ZONISAMIDE 100 MG/1
CAPSULE ORAL
Qty: 270 CAPSULE | Refills: 3 | Status: SHIPPED | OUTPATIENT
Start: 2020-01-30 | End: 2020-08-25

## 2020-01-30 RX ORDER — LAMOTRIGINE 100 MG/1
TABLET ORAL
Qty: 360 TABLET | Refills: 3 | Status: SHIPPED | OUTPATIENT
Start: 2020-01-30 | End: 2020-08-25

## 2020-01-30 ASSESSMENT — PAIN SCALES - GENERAL: PAINLEVEL: NO PAIN (0)

## 2020-01-30 NOTE — PROGRESS NOTES
[facility]    Patient:  Ana Paula Rosenberg  :  1995   Age:  24 year old   Today's Office Visit:  2020         Epilepsy Data copied forward:  First seizure at the age of 17, 2013 she had generalized tonic-clonic convulsion. She had another one on 2013, again this was out of sleep and subsequently she had an EEG which was normal.  She had total of 8 seizures and it seems that she averages approximately 1-2 per year.  She was initially treated with levetiracetam around .  Max dose was 1500 mg per day and that was not entirely helpful, because she had severe side effects, mainly of severe depression and severe irritability.  She was then transitioned at the age of 19 to oxcarbazepine, which has been gradually increased over the years to current dose.  However, the patient did have an episode of hyponatremia with sodium of 125 in 2018     Seizure type 1, she states she feels as though she is going in and out with her vision.  She describes it as though her contacts are not in her eye and her vision just feels off.  This lasts approximately 2 minutes.  Followed by loss of consciousness and awareness, whole body stiffening, her eyes go back and she is low amplitude trembling and the clicking noise in her throat.  No morning jerks, she denies that.  Her mom has not witnessed that she stares off into space or is unresponsive.  The patient denies having recurrent episodes of auras.      Interval History: She came without her mother today. Last seizure was 10/2018 ( generalized tonic-clonic convulsion) and prior to 2018 and prior to this she had one generalized tonic-clonic convulsion per year.  Since her last visit she has reduced her oxcarbazepine and increase lamotrigine.  She is doing great on lamotrigine. She is sexually active and not on contraceptive, we talked about this. I reviewed risk of teratogenicity associated with current antiepileptic drug with patient,   importance of abstinence or birth control to prevent unplanned pregnancy while on antiepileptic drug, encourage she take folic acid 2 mg per day, and reviewed the importance of pregnancy planning. Patient expressed understanding of associated risks.     She expressed concerns about weight lost, I suggested we may reduce zonisamide, she declined. Overall she is very happy with this not want to change her seizure medications.  She has not had any emergency room visits or hospitalizations since I last saw her.  She has some fatigue.  She denies having dizziness, no double vision, no abdominal pain, no falls, no ataxia.      Current antiepileptic drug              Medication Name   Tablet Size         8 AM  (morning)   4pm  9 PM (Night)   Notes    Lamotrigine 100 mg  3 tablet    1 tablet       Zonisamide 100 mg       3 capsules        SOCIAL HISTORY:  She has no history of events,  physical, verbal or sexual trauma.  She had a stable upbringing and supportive family.  She is working at [employer]. Completed associate in [employer]. 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  She is not .  She drinks 2-3 drinks a couple of times per month.  Does not smoke, does not do recreational drugs.  She has coffee intermittently.       PSYCHOLOGICAL HISTORY:  She currently does not have any depressive symptoms.  She does have a little bit of anxiety.  She describes herself to be very friendly, talkative, spiritual, a people pleaser.  She describes her Yazidi to be Jew. Encouraged she seek psychotherapy visits for relationship with her mother.      REVIEW OF SYSTEMS:  No headaches, no vision changes, no shortness of breath, chest pain, nausea, vomiting, diarrhea.  No upper or lower extremity weakness or numbness.        FEMALE HISTORY:  She started menstruating at the age of 15.  She is not pregnant.  She would like to get pregnant in the future. She is sexually active and not on contraceptive agent.      EXAMINATION /81 (BP  Location: Left arm, Patient Position: Sitting, Cuff Size: Adult Regular)   Pulse 93   Wt 130 lb (59 kg)      Wt Readings from Last 4 Encounters:   01/30/20 130 lb (59 kg)   07/30/19 135 lb (61.2 kg)   05/16/19 136 lb (61.7 kg)   12/20/18 142 lb 6.4 oz (64.6 kg)     Alert, orientated, speech is fluent, face is symmetric, extra-ocular movement in tact, no focal deficits noted.Gait is stable.      ASSESSMENT:    Epilepsy (based on clinical history). Epilepsy syndrome unknown.     Discussion: A 24-year-old female who presents with recurrent seizure-like spells.  These spells are described as an aura, vision changes followed by whole body stiffening with low amplitude trembling and repetitive clicking noises.  I suspect the spell as a generalized tonic-clonic seizure which usually occur at nighttime. Age of seizure onset was 17.  EEG in 2018 was notable for a burst of sharply contoured delta activity in the awake state.  It is difficult to decipher if these are generalized epileptiform discharges or a burst of delta slowing that are simply sharply contoured and representing a mild encephalopathy.  I suspect she does have epilepsy and just not sure if it is generalized or partial epilepsy.  Her aura suggests perhaps it may be a partial epilepsy.  She does not have features consistent with juvenile myoclonic epilepsy, however.  At this time it is best that we use broad-spectrum antiepileptic agent.      She is tolerating lamotrigine and zonisamide combination well.  She has lost weight on zonisamide. I suggested we reduce zonisamide and she declined. Continue same dose of antiepileptic drug with no changes.  Additionally, if needed other medications that are broad-spectrum that may be considered in the future are Depakote (if on birth control), topiramate (if on birth control), Fycompa, Vimpat; not in this particular order. Patient was agreeable with this plan of care.    I reviewed risk of teratogenicity associated with  current antiepileptic drug with patient,  importance of abstinence or birth control to prevent unplanned pregnancy while on antiepileptic drug, encourage she take folic acid 2 mg per day, and reviewed the importance of pregnancy planning. Patient expressed understanding of associated risks.          PLAN:                Medication Name   Tablet Size         8 AM  (morning)   4pm  9 PM (Night)   Notes    Lamotrigine 100 mg   3 tablet    1 tablet       Zonisamide 100 mg      3 capsules                    Goal lamotrigine level is <14 and zonisamide <40  Follow up  6 months   Check antiepileptic drug for efficacy, toxicity, and side effects.    Encouraged talk therapy and contraception        I spent 40 minutes with the patient. During this time counseling and coordination of care exceeded 50% of the face to face visit time. I addressed all questions the patient/caregiver raised in regards to the patient's medical care.     Kellee Nuñez MD

## 2020-01-30 NOTE — PATIENT INSTRUCTIONS
Blood draw at [facility]     Drink plenty of water to prevent kidney stones     Continue same seizure medications              Medication Name   Tablet Size         8 AM  (morning)   4pm  9 PM (Night)   Notes    Lamotrigine 100 mg  3 tablet    1 tablet       Zonisamide 100 mg       3 capsules            Kellee Nuñez MD

## 2020-01-30 NOTE — LETTER
2020     RE: Ana Paula Rosenberg  : 1995   MRN: [id number]      Dear Colleague,    Thank you for referring your patient, Ana Paula Rosenberg, to the [facility]. Please see a copy of my visit note below.    [facility] Epilepsy Care Progress Note    Patient:  Ana Paula Rosenberg  :  1995   Age:  24 year old   Today's Office Visit:  2020         Epilepsy Data copied forward:  First seizure at the age of 17, 2013 she had generalized tonic-clonic convulsion. She had another one on 2013, again this was out of sleep and subsequently she had an EEG which was normal.  She had total of 8 seizures and it seems that she averages approximately 1-2 per year.  She was initially treated with levetiracetam around .  Max dose was 1500 mg per day and that was not entirely helpful, because she had severe side effects, mainly of severe depression and severe irritability.  She was then transitioned at the age of 19 to oxcarbazepine, which has been gradually increased over the years to current dose.  However, the patient did have an episode of hyponatremia with sodium of 125 in 2018     Seizure type 1, she states she feels as though she is going in and out with her vision.  She describes it as though her contacts are not in her eye and her vision just feels off.  This lasts approximately 2 minutes.  Followed by loss of consciousness and awareness, whole body stiffening, her eyes go back and she is low amplitude trembling and the clicking noise in her throat.  No morning jerks, she denies that.  Her mom has not witnessed that she stares off into space or is unresponsive.  The patient denies having recurrent episodes of auras.      Interval History: She came without her mother today. Last seizure was 10/2018 ( generalized tonic-clonic convulsion) and prior to 2018 and prior to this she had one generalized tonic-clonic convulsion per year.  Since  her last visit she has reduced her oxcarbazepine and increase lamotrigine.  She is doing great on lamotrigine. She is sexually active and not on contraceptive, we talked about this. I reviewed risk of teratogenicity associated with current antiepileptic drug with patient,  importance of abstinence or birth control to prevent unplanned pregnancy while on antiepileptic drug, encourage she take folic acid 2 mg per day, and reviewed the importance of pregnancy planning. Patient expressed understanding of associated risks.     She expressed concerns about weight lost, I suggested we may reduce zonisamide, she declined. Overall she is very happy with this not want to change her seizure medications.  She has not had any emergency room visits or hospitalizations since I last saw her.  She has some fatigue.  She denies having dizziness, no double vision, no abdominal pain, no falls, no ataxia.      Current antiepileptic drug              Medication Name   Tablet Size         8 AM  (morning)   4pm  9 PM (Night)   Notes    Lamotrigine 100 mg  3 tablet    1 tablet       Zonisamide 100 mg       3 capsules        SOCIAL HISTORY:  She has no history of events,  physical, verbal or sexual trauma.  She had a stable upbringing and supportive family.  She is working at [employer]. Completed associate in [employer]. 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  She is not .  She drinks 2-3 drinks a couple of times per month.  Does not smoke, does not do recreational drugs.  She has coffee intermittently.       PSYCHOLOGICAL HISTORY:  She currently does not have any depressive symptoms.  She does have a little bit of anxiety.  She describes herself to be very friendly, talkative, spiritual, a people pleaser.  She describes her [place] to be [employer]. Encouraged she seek psychotherapy visits for relationship with her mother.      REVIEW OF SYSTEMS:  No headaches, no vision changes, no shortness of breath, chest pain, nausea, vomiting,  diarrhea.  No upper or lower extremity weakness or numbness.        FEMALE HISTORY:  She started menstruating at the age of 15.  She is not pregnant.  She would like to get pregnant in the future. She is sexually active and not on contraceptive agent.      EXAMINATION /81 (BP Location: Left arm, Patient Position: Sitting, Cuff Size: Adult Regular)   Pulse 93   Wt 130 lb (59 kg)      Wt Readings from Last 4 Encounters:   01/30/20 130 lb (59 kg)   07/30/19 135 lb (61.2 kg)   05/16/19 136 lb (61.7 kg)   12/20/18 142 lb 6.4 oz (64.6 kg)     Alert, orientated, speech is fluent, face is symmetric, extra-ocular movement in tact, no focal deficits noted.Gait is stable.      ASSESSMENT:    Epilepsy (based on clinical history). Epilepsy syndrome unknown.     Discussion: A 24-year-old female who presents with recurrent seizure-like spells.  These spells are described as an aura, vision changes followed by whole body stiffening with low amplitude trembling and repetitive clicking noises.  I suspect the spell as a generalized tonic-clonic seizure which usually occur at nighttime. Age of seizure onset was 17.  EEG in 2018 was notable for a burst of sharply contoured delta activity in the awake state.  It is difficult to decipher if these are generalized epileptiform discharges or a burst of delta slowing that are simply sharply contoured and representing a mild encephalopathy.  I suspect she does have epilepsy and just not sure if it is generalized or partial epilepsy.  Her aura suggests perhaps it may be a partial epilepsy.  She does not have features consistent with juvenile myoclonic epilepsy, however.  At this time it is best that we use broad-spectrum antiepileptic agent.      She is tolerating lamotrigine and zonisamide combination well.  She has lost weight on zonisamide. I suggested we reduce zonisamide and she declined. Continue same dose of antiepileptic drug with no changes.  Additionally, if needed other  medications that are broad-spectrum that may be considered in the future are Depakote (if on birth control), topiramate (if on birth control), Fycompa, Vimpat; not in this particular order. Patient was agreeable with this plan of care.    I reviewed risk of teratogenicity associated with current antiepileptic drug with patient,  importance of abstinence or birth control to prevent unplanned pregnancy while on antiepileptic drug, encourage she take folic acid 2 mg per day, and reviewed the importance of pregnancy planning. Patient expressed understanding of associated risks.          PLAN:                Medication Name   Tablet Size         8 AM  (morning)   4pm  9 PM (Night)   Notes    Lamotrigine 100 mg   3 tablet    1 tablet       Zonisamide 100 mg      3 capsules                    Goal lamotrigine level is <14 and zonisamide <40  Follow up  6 months   Check antiepileptic drug for efficacy, toxicity, and side effects.    Encouraged talk therapy and contraception        I spent 40 minutes with the patient. During this time counseling and coordination of care exceeded 50% of the face to face visit time. I addressed all questions the patient/caregiver raised in regards to the patient's medical care.     Kellee Nuñez MD

## 2020-03-02 ENCOUNTER — HEALTH MAINTENANCE LETTER (OUTPATIENT)
Age: 25
End: 2020-03-02

## 2020-08-25 ENCOUNTER — VIRTUAL VISIT (OUTPATIENT)
Dept: NEUROLOGY | Facility: CLINIC | Age: 25
End: 2020-08-25
Payer: COMMERCIAL

## 2020-08-25 DIAGNOSIS — G40.319 GENERALIZED CONVULSIVE EPILEPSY WITH INTRACTABLE EPILEPSY (H): ICD-10-CM

## 2020-08-25 RX ORDER — ZONISAMIDE 100 MG/1
CAPSULE ORAL
Qty: 270 CAPSULE | Refills: 3 | Status: SHIPPED | OUTPATIENT
Start: 2020-08-25 | End: 2021-07-09

## 2020-08-25 RX ORDER — LAMOTRIGINE 100 MG/1
TABLET ORAL
Qty: 360 TABLET | Refills: 3 | Status: SHIPPED | OUTPATIENT
Start: 2020-08-25 | End: 2021-07-09

## 2020-08-25 NOTE — PROGRESS NOTES
"Ana Paula Rosenberg is a 25 year old female who is being evaluated via a billable video visit.      The patient has been notified of following:     \"This video visit will be conducted via a call between you and your physician/provider. We have found that certain health care needs can be provided without the need for an in-person physical exam.  This service lets us provide the care you need with a video conversation.  If a prescription is necessary we can send it directly to your pharmacy.  If lab work is needed we can place an order for that and you can then stop by our lab to have the test done at a later time.    Video visits are billed at different rates depending on your insurance coverage.  Please reach out to your insurance provider with any questions.    If during the course of the call the physician/provider feels a video visit is not appropriate, you will not be charged for this service.\"    Patient has given verbal consent for Video visit? Yes  How would you like to obtain your AVS? MyChart  If you are dropped from the video visit, the video invite should be resent to: Text to cell phone: cell  Will anyone else be joining your video visit? No        Video-Visit Details    Type of service:  Video Visit    Video Start Time: 8:21 AM  Video End Time: 8:47 AM    Originating Location (pt. Location): Home    Distant Location (provider location):  [facility] EPILEPSY CARE     Platform used for Video Visit: Anil Nuñez MD    [facility] Epilepsy Care Progress Note    Patient:  Ana Paula Rosenberg  :  1995   Age:  25 year old   Today's Office Visit:  2020      Epilepsy Data copied forward:  First seizure at the age of 17, 2013 she had generalized tonic-clonic convulsion. She had total of 8 seizures and it seems that she averages approximately 1-2 per year.  She was initially treated with levetiracetam . max dose was 1500 mg per day and that was not entirely helpful, because she had severe " "depression and severe irritability.  She was then transitioned at the age of 19 to oxcarbazepine, which has been gradually increased over the years to current dose.  However, the patient did have an episode of hyponatremia with sodium of 125 in 2018     Seizure type 1, she states she feels as though she is going in and out with her vision.  She describes it as though her contacts are not in her eye and her vision just feels off.  This lasts approximately 2 minutes.  Followed by loss of consciousness and awareness, whole body stiffening, her eyes go back and she is low amplitude trembling and the clicking noise in her throat.  No morning jerks, she denies that.  Her mom has not witnessed that she stares off into space or is unresponsive.  The patient denies having recurrent episodes of auras.      Interval History:     Video visit today, she was alone. Last seizure was 10/2018 ( generalized tonic-clonic convulsion) and prior to 8/2018 and prior to this she had one generalized tonic-clonic convulsion per year.  Since her last visit she is taking lamotrigine and zonisamide. At 6-7 pm she \"feels off, feels like I need caffeine for extra boost\", She has not had any emergency room visits or hospitalizations since I last saw her.  She has some fatigue.  She denies having dizziness, no double vision, no abdominal pain, no falls, no ataxia, no kidney.      She is sexually active and not on contraceptive, we talked about this. I reviewed risk of teratogenicity associated with current antiepileptic drug with patient,  importance of abstinence or birth control to prevent unplanned pregnancy while on antiepileptic drug, encourage she take folic acid 2 mg per day, and reviewed the importance of pregnancy planning. Patient expressed understanding of associated risks.     Her weight is stable, she eats multiple meals per day. Overall she is very happy with this not want to change her seizure medications.      Current antiepileptic " drug              Medication Name   Tablet Size         8 AM  (morning)   4pm  9 PM (Night)   Notes    Lamotrigine 100 mg  3 tablet    1 tablet       Zonisamide 100 mg       3 capsules        SOCIAL HISTORY:  She has no history of events,  physical, verbal or sexual trauma.  She had a stable upbringing and supportive family.  She is working at [employer]. Completed associate in [employer]. 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  She is not .  She drinks 2-3 drinks a couple of times per month.  Does not smoke, does not do recreational drugs.  She has coffee intermittently.       PSYCHOLOGICAL HISTORY:  She currently does not have any depressive symptoms.  She does have a little bit of anxiety.  She describes herself to be very friendly, talkative, spiritual, a people pleaser.  She describes her Roman Catholic to be Pentecostalism. Encouraged she seek psychotherapy visits for relationship with her mother.      REVIEW OF SYSTEMS:  No headaches, no vision changes, no shortness of breath, no nausea/vomiting/diarrhea.  No upper or lower extremity weakness or numbness.        FEMALE HISTORY:  She started menstruating at the age of 15.  She is not pregnant.  She would like to get pregnant in the future. She is sexually active and not on contraceptive agent.      EXAMINATION There were no vitals taken for this visit.     Wt Readings from Last 4 Encounters:   01/30/20 130 lb (59 kg)   07/30/19 135 lb (61.2 kg)   05/16/19 136 lb (61.7 kg)   12/20/18 142 lb 6.4 oz (64.6 kg)     Alert, orientated, speech is fluent, face is symmetric, extra-ocular movement in tact, no focal deficits noted.      ASSESSMENT:    Epilepsy (based on clinical history). Epilepsy syndrome unknown.     Discussion: A 25-year-old female who presents with recurrent seizure-like spells.  These spells are described as an aura, vision changes followed by whole body stiffening with low amplitude trembling and repetitive clicking noises.  I suspect the spell are generalized  tonic-clonic seizure which usually occur at nighttime. Age of seizure onset was 17.  EEG in 2018 was notable for a burst of sharply contoured delta activity in the awake state.  It is difficult to decipher if these are generalized epileptiform discharges or a burst of delta slowing that are simply sharply contoured and representing a mild encephalopathy.  I suspect she does have epilepsy and just not sure if it is generalized or focal  epilepsy.  Her aura suggests perhaps it may be a partial epilepsy.  She does not have features consistent with juvenile myoclonic epilepsy, however.  At this time it is best that we use broad-spectrum antiepileptic agent.      She is tolerating lamotrigine and zonisamide combination well.  She has lost weight on zonisamide. I suggested we reduce zonisamide and she declined. Continue same dose of antiepileptic drug with no changes.  Additionally, if needed other medications that are broad-spectrum that may be considered in the future are Depakote (if on birth control), topiramate (if on birth control), Fycompa, Vimpat; not in this particular order. Patient was agreeable with this plan of care.    I reviewed risk of teratogenicity associated with current antiepileptic drug with patient,  importance of abstinence or birth control to prevent unplanned pregnancy while on antiepileptic drug, encourage she take folic acid 2-4 mg per day, and reviewed the importance of pregnancy planning. Patient expressed understanding of associated risks. She is hesitant to get birth control because she is under her parents insurance.          PLAN:                Medication Name   Tablet Size         8 AM  (morning)   4pm  9 PM (Night)   Notes    Lamotrigine 100 mg   3 tablet    1 tablet       Zonisamide 100 mg      3 capsules                    Goal lamotrigine level is <14 and zonisamide <40  Follow up  12 months   Check antiepileptic drug for efficacy, toxicity, and side effects.    Repeat MRI in next  2-3 years.       I spent 25 minutes with the patient. During this time counseling and coordination of care exceeded 50% of the face to face visit time. I addressed all questions the patient/caregiver raised in regards to the patient's medical care.     Kellee Nuñez MD

## 2020-08-25 NOTE — LETTER
2020     RE: Ana Paula Rosenberg  : 1995   MRN: [id number]      Dear Colleague,    Thank you for referring your patient, Ana Paula Rosenberg, to the [facility] EPILEPSY CARE at [facility]. Please see a copy of my visit note below.    Left message to call back to go over review questions before appointment.    Ana Paula Rosenberg is a 25 year old female who is being evaluated via a billable video visit.      Video-Visit Details  Type of service:  Video Visit  Video Start Time: 8:21 AM  Video End Time: 8:47 AM  Originating Location (pt. Location): Home  Distant Location (provider location):  [facility] EPILEPSY CARE   Platform used for Video Visit: Anil Nuñez MD    [facility] Epilepsy Care Progress Note    Patient:  Ana Paula Rosenberg  :  1995   Age:  25 year old   Today's Office Visit:  2020    Epilepsy Data copied forward:  First seizure at the age of 17, 2013 she had generalized tonic-clonic convulsion. She had total of 8 seizures and it seems that she averages approximately 1-2 per year.  She was initially treated with levetiracetam . max dose was 1500 mg per day and that was not entirely helpful, because she had severe depression and severe irritability.  She was then transitioned at the age of 19 to oxcarbazepine, which has been gradually increased over the years to current dose.  However, the patient did have an episode of hyponatremia with sodium of 125 in 2018     Seizure type 1, she states she feels as though she is going in and out with her vision.  She describes it as though her contacts are not in her eye and her vision just feels off.  This lasts approximately 2 minutes.  Followed by loss of consciousness and awareness, whole body stiffening, her eyes go back and she is low amplitude trembling and the clicking noise in her throat.  No morning jerks, she denies that.  Her mom has not witnessed that she stares off into space or is  "unresponsive.  The patient denies having recurrent episodes of auras.      Interval History:     Video visit today, she was alone. Last seizure was 10/2018 ( generalized tonic-clonic convulsion) and prior to 8/2018 and prior to this she had one generalized tonic-clonic convulsion per year.  Since her last visit she is taking lamotrigine and zonisamide. At 6-7 pm she \"feels off, feels like I need caffeine for extra boost\", She has not had any emergency room visits or hospitalizations since I last saw her.  She has some fatigue.  She denies having dizziness, no double vision, no abdominal pain, no falls, no ataxia, no kidney.      She is sexually active and not on contraceptive, we talked about this. I reviewed risk of teratogenicity associated with current antiepileptic drug with patient,  importance of abstinence or birth control to prevent unplanned pregnancy while on antiepileptic drug, encourage she take folic acid 2 mg per day, and reviewed the importance of pregnancy planning. Patient expressed understanding of associated risks.     Her weight is stable, she eats multiple meals per day. Overall she is very happy with this not want to change her seizure medications.      Current antiepileptic drug              Medication Name   Tablet Size         8 AM  (morning)   4pm  9 PM (Night)   Notes    Lamotrigine 100 mg  3 tablet    1 tablet       Zonisamide 100 mg       3 capsules        SOCIAL HISTORY:  She has no history of events,  physical, verbal or sexual trauma.  She had a stable upbringing and supportive family.  She is working at [employer]. 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  She is not .  She drinks 2-3 drinks a couple of times per month.  Does not smoke, does not do recreational drugs.  She has coffee intermittently.       PSYCHOLOGICAL HISTORY:  She currently does not have any depressive symptoms.  She does have a little bit of anxiety.  She describes herself to be " very friendly, talkative, spiritual, a people pleaser.  She describes her Yarsanism to be Evangelical. Encouraged she seek psychotherapy visits for relationship with her mother.      REVIEW OF SYSTEMS:  No headaches, no vision changes, no shortness of breath, no nausea/vomiting/diarrhea.  No upper or lower extremity weakness or numbness.        FEMALE HISTORY:  She started menstruating at the age of 15.  She is not pregnant.  She would like to get pregnant in the future. She is sexually active and not on contraceptive agent.      EXAMINATION There were no vitals taken for this visit.     Wt Readings from Last 4 Encounters:   01/30/20 130 lb (59 kg)   07/30/19 135 lb (61.2 kg)   05/16/19 136 lb (61.7 kg)   12/20/18 142 lb 6.4 oz (64.6 kg)     Alert, orientated, speech is fluent, face is symmetric, extra-ocular movement in tact, no focal deficits noted.      ASSESSMENT:    Epilepsy (based on clinical history). Epilepsy syndrome unknown.     Discussion: A 25-year-old female who presents with recurrent seizure-like spells.  These spells are described as an aura, vision changes followed by whole body stiffening with low amplitude trembling and repetitive clicking noises.  I suspect the spell are generalized tonic-clonic seizure which usually occur at nighttime. Age of seizure onset was 17.  EEG in 2018 was notable for a burst of sharply contoured delta activity in the awake state.  It is difficult to decipher if these are generalized epileptiform discharges or a burst of delta slowing that are simply sharply contoured and representing a mild encephalopathy.  I suspect she does have epilepsy and just not sure if it is generalized or focal  epilepsy.  Her aura suggests perhaps it may be a partial epilepsy.  She does not have features consistent with juvenile myoclonic epilepsy, however.  At this time it is best that we use broad-spectrum antiepileptic agent.      She is tolerating lamotrigine and zonisamide combination well.   She has lost weight on zonisamide. I suggested we reduce zonisamide and she declined. Continue same dose of antiepileptic drug with no changes.  Additionally, if needed other medications that are broad-spectrum that may be considered in the future are Depakote (if on birth control), topiramate (if on birth control), Fycompa, Vimpat; not in this particular order. Patient was agreeable with this plan of care.    I reviewed risk of teratogenicity associated with current antiepileptic drug with patient,  importance of abstinence or birth control to prevent unplanned pregnancy while on antiepileptic drug, encourage she take folic acid 2-4 mg per day, and reviewed the importance of pregnancy planning. Patient expressed understanding of associated risks. She is hesitant to get birth control because she is under her parents insurance.      PLAN:              Medication Name   Tablet Size         8 AM  (morning)   4pm  9 PM (Night)   Notes    Lamotrigine 100 mg   3 tablet    1 tablet       Zonisamide 100 mg      3 capsules                  Goal lamotrigine level is <14 and zonisamide <40  Follow up  12 months   Check antiepileptic drug for efficacy, toxicity, and side effects.    Repeat MRI in next 2-3 years.     I spent 25 minutes with the patient. During this time counseling and coordination of care exceeded 50% of the face to face visit time. I addressed all questions the patient/caregiver raised in regards to the patient's medical care.   Kellee Nuñez MD

## 2020-11-09 ENCOUNTER — TELEPHONE (OUTPATIENT)
Dept: NEUROLOGY | Facility: CLINIC | Age: 25
End: 2020-11-09

## 2021-03-07 DIAGNOSIS — G40.319 GENERALIZED CONVULSIVE EPILEPSY WITH INTRACTABLE EPILEPSY (H): ICD-10-CM

## 2021-03-08 RX ORDER — ZONISAMIDE 100 MG/1
CAPSULE ORAL
Qty: 270 CAPSULE | Refills: 2 | OUTPATIENT
Start: 2021-03-08

## 2021-03-08 NOTE — TELEPHONE ENCOUNTER
zonisamide (ZONEGRAN) 100 MG capsule   300 mg at night     Last Written Prescription Date:  8/25/20  Last Fill Quantity: 270,   # refills: 3  Last Office Visit : 8/25/20   Return in about 1 year (around 8/25/2021).    Future Office visit: none    Duplicate: refills remaining.

## 2021-04-18 ENCOUNTER — HEALTH MAINTENANCE LETTER (OUTPATIENT)
Age: 26
End: 2021-04-18

## 2021-06-28 ENCOUNTER — TELEPHONE (OUTPATIENT)
Dept: NEUROLOGY | Facility: CLINIC | Age: 26
End: 2021-06-28

## 2021-06-28 NOTE — TELEPHONE ENCOUNTER
What is the concern that needs to be addressed by a nurse? Patient is seeing PCP next week for ADHD medications.  Would like some insight on possible interactions with neurology medications.  She also has questions about whether seizure medications would be similar to medications someone would take for anxiety.    May a detailed message be left on voicemail? Would prefer to discuss    Date of last office visit: 8/25/20     Message routed to: [facility] RN Pool

## 2021-06-29 NOTE — TELEPHONE ENCOUNTER
This nurse received a message from Dr. Nuñez in regards to ADHD medications. She asked for a visit to be scheduled to discuss this in more detail. This was arranged for the next available appt on 7/9/21.

## 2021-07-09 ENCOUNTER — VIRTUAL VISIT (OUTPATIENT)
Dept: NEUROLOGY | Facility: CLINIC | Age: 26
End: 2021-07-09
Payer: COMMERCIAL

## 2021-07-09 DIAGNOSIS — G40.319 GENERALIZED CONVULSIVE EPILEPSY WITH INTRACTABLE EPILEPSY (H): ICD-10-CM

## 2021-07-09 RX ORDER — LAMOTRIGINE 100 MG/1
TABLET ORAL
Qty: 360 TABLET | Refills: 3 | Status: SHIPPED | OUTPATIENT
Start: 2021-07-09 | End: 2022-10-31

## 2021-07-09 RX ORDER — FOLIC ACID 1 MG/1
2 TABLET ORAL DAILY
Qty: 180 TABLET | Refills: 3 | Status: SHIPPED | OUTPATIENT
Start: 2021-07-09

## 2021-07-09 RX ORDER — ZONISAMIDE 100 MG/1
CAPSULE ORAL
Qty: 270 CAPSULE | Refills: 3 | Status: SHIPPED | OUTPATIENT
Start: 2021-07-09 | End: 2022-08-01

## 2021-07-09 NOTE — PROGRESS NOTES
Ana Paula is a 25 year old who is being evaluated via a billable video visit.      How would you like to obtain your AVS? MyChart  If the video visit is dropped, the invitation should be resent by: Text to cell phone: [phone]  Will anyone else be joining your video visit? Yes: mom, Marti and patient. How would they like to receive their invitation? Other e-mail: [email or web address] [phone]    [email or web address]      Video Start Time: 2:02 PM  Video-Visit Details    Type of service:  Video Visit    Video End Time:2:30 pm     Originating Location (pt. Location): Home    Distant Location (provider location):  [facility] EPILEPSY C[facility]     Platform used for Video Visit: [facility]    Patient:  Ana Paula Rosenberg  :  1995   Age:  25 year old   Today's Office Visit:  2021    Epilepsy Data:         Epilepsy Data copied forward:  First seizure at the age of 17, 2013 she had generalized tonic-clonic convulsion. She had total of 8 seizures and it seems that she averages approximately 1-2 per year.  She was initially treated with levetiracetam . max dose was 1500 mg per day and that was not entirely helpful, because she had severe depression and severe irritability.  She was then transitioned at the age of 19 to oxcarbazepine, which has been gradually increased over the years to current dose.  However, the patient did have an episode of hyponatremia with sodium of 125 in 2018     Seizure type 1, she states she feels as though she is going in and out with her vision.  She describes it as though her contacts are not in her eye and her vision just feels off.  This lasts approximately 2 minutes.  Followed by loss of consciousness and awareness, whole body stiffening, her eyes go back and she is low amplitude trembling and the clicking noise in her throat.  No morning jerks, she denies that.  Her mom has not witnessed that she stares  off into space or is unresponsive.  The patient denies having recurrent episodes of auras.      Interval History:     Marti joined visit.  Last seizure was 10/2018 ( generalized tonic-clonic convulsion) and prior to 8/2018 and prior to this she had one generalized tonic-clonic convulsion per year.  Since her last visit she is taking lamotrigine and zonisamide.  Overall she is doing great.  She has a hard time focusing on her classes at school and would like to start ADHD meds.  I did review with her there is a small risk of breakthrough seizures, however the benefits outweigh the risks.  Additionally she does have anxiety and I encouraged her to exercise.  She has not had any emergency room visits or hospitalizations since I last saw her.  She has some fatigue.  She denies having dizziness, no double vision, no abdominal pain, no falls, no ataxia, no kidney.  Her weight is stable, she eats multiple meals per day. Overall she is very happy with this not want to change her seizure medications.      Current antiepileptic drug              Medication Name   Tablet Size         8 AM  (morning)   4pm  9 PM (Night)   Notes    Lamotrigine 100 mg  3 tablet    1 tablet       Zonisamide 100 mg       3 capsules        SOCIAL HISTORY:  She has no history of events,  physical, verbal or sexual trauma.  She had a stable upbringing and supportive family.  She is working at [employer]. 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  She is not .  She drinks 2-3 drinks a couple of times per month.  Does not smoke, does not do recreational drugs.  She has coffee intermittently.       PSYCHOLOGICAL HISTORY:  She currently does not have any depressive symptoms.  She does have a little bit of anxiety.  She describes herself to be very friendly, talkative, spiritual, a people pleaser.  She describes her Zoroastrianism to be Lutheran. Encouraged she seek psychotherapy visits for relationship with her mother.      REVIEW  OF SYSTEMS:  No headaches, no vision changes, no shortness of breath, no nausea/vomiting/diarrhea.  No upper or lower extremity weakness or numbness.        FEMALE HISTORY:  She started menstruating at the age of 15.  She is not pregnant.  She would like to get pregnant in the future. She is sexually active and not on contraceptive agent.      EXAMINATION There were no vitals taken for this visit.     Wt Readings from Last 4 Encounters:   01/30/20 130 lb (59 kg)   07/30/19 135 lb (61.2 kg)   05/16/19 136 lb (61.7 kg)   12/20/18 142 lb 6.4 oz (64.6 kg)     Alert, orientated, speech is fluent, face is symmetric, extra-ocular movement in tact, no focal deficits noted.      ASSESSMENT:    Epilepsy (based on clinical history). Epilepsy syndrome unknown.     Discussion: A 25-year-old female who presents with recurrent seizure-like spells.  These spells are described as an aura, vision changes followed by whole body stiffening with low amplitude trembling and repetitive clicking noises.  I suspect the spell are generalized tonic-clonic seizure which usually occur at nighttime. Age of seizure onset was 17.  EEG in 2018 was notable for a burst of sharply contoured delta activity in the awake state.  It is difficult to decipher if these are generalized epileptiform discharges or a burst of delta slowing that are simply sharply contoured and representing a mild encephalopathy.  I suspect she does have epilepsy and just not sure if it is generalized or focal  epilepsy.  Her aura suggests perhaps it may be a partial epilepsy.  She does not have features consistent with juvenile myoclonic epilepsy, however.  At this time it is best that we use broad-spectrum antiepileptic agent.  Since we started lamotrigine and stopped oxcarbazepine she has become seizure-free.  The combination of lamotrigine and zonisamide has resulted in seizure freedom since 2018.  We will continue the same medications.    Additionally, if needed other  medications that are broad-spectrum that may be considered in the future are Depakote (if on birth control), topiramate (if on birth control), Fycompa, Vimpat; not in this particular order. Patient was agreeable with this plan of care.    In regards to ADHD, she will review with her primary care doctor the best medication options.  She has a hard time at school and the ADHD meds will help her focus and accomplish her academic goals.  Certainly there is a low risk of seizures with ADHD med and I reviewed this with her.      In the past I reviewed risk of teratogenicity associated with current antiepileptic drug with patient,  importance of abstinence or birth control to prevent unplanned pregnancy while on antiepileptic drug, encourage she take folic acid 2-4 mg per day, and reviewed the importance of pregnancy planning. Patient expressed understanding of associated risks.  In the past she has not had birth control because she is on her parents's insurance.         PLAN:                Medication Name   Tablet Size         8 AM  (morning)   4pm  9 PM (Night)   Notes    Lamotrigine 100 mg   3 tablet    1 tablet       Zonisamide 100 mg      3 capsules                  Start folic acid 2 mg per day   Talk to primary care provider about ADHD medications   Exercise for anxiety   Follow up  12 months   Check antiepileptic drug for efficacy, toxicity, and side effects near home.  Follow up  1 year          I spent 29 minutes with the patient. During this time counseling and coordination of care exceeded 50% of the face to face visit time. I addressed all questions the patient/caregiver raised in regards to the patient's medical care.     Kellee Nuñez MD

## 2021-08-09 ENCOUNTER — TELEPHONE (OUTPATIENT)
Dept: NEUROLOGY | Facility: CLINIC | Age: 26
End: 2021-08-09

## 2021-10-03 ENCOUNTER — HEALTH MAINTENANCE LETTER (OUTPATIENT)
Age: 26
End: 2021-10-03

## 2022-05-15 ENCOUNTER — HEALTH MAINTENANCE LETTER (OUTPATIENT)
Age: 27
End: 2022-05-15

## 2022-07-30 DIAGNOSIS — G40.319 GENERALIZED CONVULSIVE EPILEPSY WITH INTRACTABLE EPILEPSY (H): ICD-10-CM

## 2022-08-01 RX ORDER — ZONISAMIDE 100 MG/1
CAPSULE ORAL
Qty: 90 CAPSULE | Refills: 0 | Status: SHIPPED | OUTPATIENT
Start: 2022-08-01 | End: 2022-09-23

## 2022-09-04 ENCOUNTER — HEALTH MAINTENANCE LETTER (OUTPATIENT)
Age: 27
End: 2022-09-04

## 2022-09-22 DIAGNOSIS — G40.319 GENERALIZED CONVULSIVE EPILEPSY WITH INTRACTABLE EPILEPSY (H): ICD-10-CM

## 2022-09-22 NOTE — LETTER
September 23, 2022      Ana Paula Rosenberg  [address] [place] [id number]        Dear Ana Paula Rosenberg    We recently received your medication refill request, and a notification that you had run out of your medications. Please be aware that refill requests generally take 3-5 business days to complete.  This is because medication refill requests must be reviewed and approved by a provider to ensure a refill is appropriate. However, in some cases the length of time to process a refill request can be longer when there are insurance issues.    All medication refill requests should be directed to your dispensing pharmacy.  Do this even if the medication bottle does not show any remaining refills are available.  The pharmacy will then contact the clinic.  Going through your dispensing pharmacy helps to reduce errors and duplications. It also helps the pharmacy plan to have supplies available when you need them. Please contact your pharmacy at least one week prior to running out of a medication to request a refill.    Medication compliance is an important part of your treatment plan.  It is critical that you seek refills of your medications before you run out.  Your individualized treatment plan will not be successful if you do not follow recommendations from your provider. It is your responsibility to ensure that you have an appropriate supply of your necessary medications.  We strongly encourage you to use a weekly medication box (or similar compliance tool), keep a diary of your seizures, and record any time you miss a dose of medication.    We understand that there can be circumstances that might interfere with your ability to request a timely refill.  However, those circumstances should be rare. To help reduce the times that this may happen, please make sure you are promptly requesting refills from your pharmacy before your supplies run low.      Thank you,    Hayde Frazier RN

## 2022-09-23 RX ORDER — ZONISAMIDE 100 MG/1
CAPSULE ORAL
Qty: 90 CAPSULE | Refills: 0 | Status: SHIPPED | OUTPATIENT
Start: 2022-09-23 | End: 2022-10-27

## 2022-10-24 DIAGNOSIS — G40.319 GENERALIZED CONVULSIVE EPILEPSY WITH INTRACTABLE EPILEPSY (H): ICD-10-CM

## 2022-10-27 RX ORDER — ZONISAMIDE 100 MG/1
CAPSULE ORAL
Qty: 90 CAPSULE | Refills: 0 | Status: SHIPPED | OUTPATIENT
Start: 2022-10-27 | End: 2022-10-31

## 2022-10-31 ENCOUNTER — VIRTUAL VISIT (OUTPATIENT)
Dept: NEUROLOGY | Facility: CLINIC | Age: 27
End: 2022-10-31
Payer: COMMERCIAL

## 2022-10-31 VITALS — BODY MASS INDEX: 21.33 KG/M2 | HEIGHT: 65 IN | WEIGHT: 128 LBS

## 2022-10-31 DIAGNOSIS — G40.319 GENERALIZED CONVULSIVE EPILEPSY WITH INTRACTABLE EPILEPSY (H): ICD-10-CM

## 2022-10-31 RX ORDER — ZONISAMIDE 100 MG/1
CAPSULE ORAL
Qty: 270 CAPSULE | Refills: 3 | Status: SHIPPED | OUTPATIENT
Start: 2022-10-31

## 2022-10-31 RX ORDER — LAMOTRIGINE 100 MG/1
TABLET ORAL
Qty: 360 TABLET | Refills: 3 | Status: SHIPPED | OUTPATIENT
Start: 2022-10-31

## 2022-10-31 RX ORDER — METHYLPHENIDATE HYDROCHLORIDE 20 MG/1
TABLET ORAL
Refills: 0 | COMMUNITY
Start: 2022-10-31

## 2022-10-31 ASSESSMENT — PAIN SCALES - GENERAL: PAINLEVEL: NO PAIN (0)

## 2022-10-31 ASSESSMENT — PATIENT HEALTH QUESTIONNAIRE - PHQ9: SUM OF ALL RESPONSES TO PHQ QUESTIONS 1-9: 0

## 2022-10-31 NOTE — PROGRESS NOTES
Ana Paula is a 27 year old who is being evaluated via a billable video visit.      How would you like to obtain your AVS? MyChart  If the video visit is dropped, the invitation should be resent by: Text to cell phone: [phone]  Will anyone else be joining your video visit? No       ABRIL Ledezma      Video-Visit Details    Video Start Time: 10:28 AM    Type of service:  Video Visit    Video End Time:10:41 AM    Originating Location (pt. Location): Home        Distant Location (provider location):  Off-site    Platform used for Video Visit: [facility]    Patient:  Ana Paula Rosenberg  :  1995   Age:  27 year old   Today's Office Visit:  10/31/2022    Epilepsy Data copied forward:  First seizure at the age of 17, 2013 she had generalized tonic-clonic convulsion. She had total of 8 seizures and it seems that she averages approximately 1-2 per year.  She was initially treated with levetiracetam . max dose was 1500 mg per day and that was not entirely helpful, because she had severe depression and severe irritability.  She was then transitioned at the age of 19 to oxcarbazepine, which has been gradually increased over the years to current dose.  However, the patient did have an episode of hyponatremia with sodium of 125 in 2018     Seizure type 1, she states she feels as though she is going in and out with her vision.  She describes it as though her contacts are not in her eye and her vision just feels off.  This lasts approximately 2 minutes.  Followed by loss of consciousness and awareness, whole body stiffening, her eyes go back and she is low amplitude trembling and the clicking noise in her throat.  No morning jerks, she denies that.  Her mom has not witnessed that she stares off into space or is unresponsive.  The patient denies having recurrent episodes of auras.      Interval History:   Last seizure was 10/2018 ( generalized tonic-clonic convulsion)  and prior to 8/2018 and prior to this she had one generalized tonic-clonic convulsion per year.  Since her last visit she is taking lamotrigine and zonisamide.  Overall she is doing great.  ADHD is taking medications and it is helpful. She has not had any emergency room visits or hospitalizations since I last saw her.  She has some fatigue.  She denies having dizziness, no double vision, no abdominal pain, no falls, no ataxia, no kidney.  Her weight is stable, she eats multiple meals per day. Overall she is very happy with this not want to change her seizure medications.      Seizure frequency:   10/2018 - generalized tonic-clonic convulsion (2018 was high stress, sleep deprivation)  8/2018 - generalized tonic-clonic convulsion   8410-3319 - averaged 1 seizure per year and had total of 4 in this time period, generalized tonic-clonic convulsion   2013 - 4 generalized tonic-clonic convulsion     Current antiepileptic drug              Medication Name   Tablet Size         8 AM  (morning)   4pm  9 PM (Night)   Notes    Lamotrigine 100 mg  3 tablet    1 tablet       Zonisamide 100 mg       3 capsules        SOCIAL HISTORY:  She has no history of events,  physical, verbal or sexual trauma.  She had a stable upbringing and supportive family.  She is working at [employer]. 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  She is not .  She drinks 2-3 drinks a couple of times per month.  Does not smoke, does not do recreational drugs.  She has coffee intermittently.       PSYCHOLOGICAL HISTORY:  Copied from prior note. She currently does not have any depressive symptoms.  She does have a little bit of anxiety.  She describes herself to be very friendly, talkative, spiritual, a people pleaser.  She describes her Voodoo to be Moravian. Encouraged she seek psychotherapy visits for relationship with her mother.          FEMALE HISTORY:  She started menstruating at the age of 15.  She would like to get  "pregnant in the future. She is sexually active and on Mirena.      EXAMINATION Ht 5' 5\" (165.1 cm)   Wt 128 lb (58.1 kg)   BMI 21.30 kg/m       Wt Readings from Last 4 Encounters:   10/31/22 128 lb (58.1 kg)   01/30/20 130 lb (59 kg)   07/30/19 135 lb (61.2 kg)   05/16/19 136 lb (61.7 kg)     Alert, orientated, speech is fluent, face is symmetric, extra-ocular movement in tact, no focal deficits noted.      ASSESSMENT:    Epilepsy (based on clinical history). Epilepsy syndrome unknown.     Discussion: 27-year-old female who presents with recurrent seizure-like spells.  These spells are described as an aura, vision changes followed by whole body stiffening with low amplitude trembling and repetitive clicking noises.  I suspect the spell are generalized tonic-clonic seizure which usually occur at nighttime. Age of seizure onset was 17.  EEG in 2018 was notable for a burst of sharply contoured delta activity in the awake state.  It is difficult to decipher if these are generalized epileptiform discharges or a burst of delta and representing a mild encephalopathy.  I suspect she does have epilepsy and just not sure if it is generalized or focal  epilepsy.  Her aura suggests she may have focal epilepsy.  She does not have features consistent with juvenile myoclonic epilepsy, however.  At this time it is best that we use broad-spectrum antiepileptic agent.  Since we started lamotrigine and stopped oxcarbazepine she has become seizure-free.  The combination of lamotrigine and zonisamide has resulted in seizure freedom since 2018.  We will continue the same medications.    Additionally, if needed other medications that are broad-spectrum that may be considered in the future are Depakote (if on birth control), topiramate (if on birth control), Fycompa, Vimpat; not in this particular order. Patient was agreeable with this plan of care.    In regards to ADHD, she started Ritalin.      In the past I reviewed risk of " teratogenicity associated with current antiepileptic drug with patient,  importance of abstinence or birth control to prevent unplanned pregnancy while on antiepileptic drug, encourage she take folic acid 2-4 mg per day, and reviewed the importance of pregnancy planning. Patient expressed understanding of associated risks. She has Mirena.        PLAN:                Medication Name   Tablet Size         8 AM  (morning)   4pm  9 PM (Night)   Notes    Lamotrigine 100 mg   3 tablet    1 tablet       Zonisamide 100 mg      3 capsules                  Start folic acid 2 mg per day   Follow up  1 year          I spent 15 minutes with the patient. During this time counseling and coordination of care exceeded 50% of the face to face visit time. I addressed all questions the patient/caregiver raised in regards to the patient's medical care.     Kellee Nuñez MD

## 2022-10-31 NOTE — PATIENT INSTRUCTIONS
Medication Name   Tablet Size         8 AM  (morning)   4pm  9 PM (Night)   Notes    Lamotrigine 100 mg   3 tablet    1 tablet       Zonisamide 100 mg      3 capsules                  Start folic acid 2 mg per day   Follow up  1 year       Kellee Nuñez MD

## 2022-10-31 NOTE — LETTER
10/31/2022     RE: Ana Paula Rosenberg  : 1995   MRN: [id number]      Dear Colleague,    Thank you for referring your patient, Ana Paula Rosenberg, to the [facility] EPI[facility] at [facility]. Please see a copy of my visit note below.    Ana Paula is a 27 year old who is being evaluated via a billable video visit.      How would you like to obtain your AVS? MyChart  If the video visit is dropped, the invitation should be resent by: Text to cell phone: [phone]  Will anyone else be joining your video visit? No       ABRIL Ledezma      Video-Visit Details    Video Start Time: 10:28 AM    Type of service:  Video Visit    Video End Time:10:41 AM    Originating Location (pt. Location): Home        Distant Location (provider location):  Off-site    Platform used for Video Visit: [facility]    Patient:  Ana Paula Rosenberg  :  1995   Age:  27 year old   Today's Office Visit:  10/31/2022    Epilepsy Data copied forward:  First seizure at the age of 17, 2013 she had generalized tonic-clonic convulsion. She had total of 8 seizures and it seems that she averages approximately 1-2 per year.  She was initially treated with levetiracetam . max dose was 1500 mg per day and that was not entirely helpful, because she had severe depression and severe irritability.  She was then transitioned at the age of 19 to oxcarbazepine, which has been gradually increased over the years to current dose.  However, the patient did have an episode of hyponatremia with sodium of 125 in 2018     Seizure type 1, she states she feels as though she is going in and out with her vision.  She describes it as though her contacts are not in her eye and her vision just feels off.  This lasts approximately 2 minutes.  Followed by loss of consciousness and awareness, whole body stiffening, her eyes go back and she is low amplitude trembling and the  clicking noise in her throat.  No morning jerks, she denies that.  Her mom has not witnessed that she stares off into space or is unresponsive.  The patient denies having recurrent episodes of auras.      Interval History:   Last seizure was 10/2018 ( generalized tonic-clonic convulsion) and prior to 8/2018 and prior to this she had one generalized tonic-clonic convulsion per year.  Since her last visit she is taking lamotrigine and zonisamide.  Overall she is doing great.  ADHD is taking medications and it is helpful. She has not had any emergency room visits or hospitalizations since I last saw her.  She has some fatigue.  She denies having dizziness, no double vision, no abdominal pain, no falls, no ataxia, no kidney.  Her weight is stable, she eats multiple meals per day. Overall she is very happy with this not want to change her seizure medications.      Seizure frequency:   10/2018 - generalized tonic-clonic convulsion (2018 was high stress, sleep deprivation)  8/2018 - generalized tonic-clonic convulsion   8147-1877 - averaged 1 seizure per year and had total of 4 in this time period, generalized tonic-clonic convulsion   2013 - 4 generalized tonic-clonic convulsion     Current antiepileptic drug              Medication Name   Tablet Size         8 AM  (morning)   4pm  9 PM (Night)   Notes    Lamotrigine 100 mg  3 tablet    1 tablet       Zonisamide 100 mg       3 capsules        SOCIAL HISTORY:  She has no history of events,  physical, verbal or sexual trauma.  She had a stable upbringing and supportive family.  She is working at [employer]. Completed associate in [employer]. 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  She is not .  She drinks 2-3 drinks a couple of times per month.  Does not smoke, does not do recreational drugs.  She has coffee intermittently.       PSYCHOLOGICAL HISTORY:  Copied from prior note. She currently does not have any depressive symptoms.  She does have a little bit of anxiety.  She  "describes herself to be very friendly, talkative, spiritual, a people pleaser.  She describes her Cheondoism to be Hinduism. Encouraged she seek psychotherapy visits for relationship with her mother.          FEMALE HISTORY:  She started menstruating at the age of 15.  She would like to get pregnant in the future. She is sexually active and on Mirena.      EXAMINATION Ht 5' 5\" (165.1 cm)   Wt 128 lb (58.1 kg)   BMI 21.30 kg/m       Wt Readings from Last 4 Encounters:   10/31/22 128 lb (58.1 kg)   01/30/20 130 lb (59 kg)   07/30/19 135 lb (61.2 kg)   05/16/19 136 lb (61.7 kg)     Alert, orientated, speech is fluent, face is symmetric, extra-ocular movement in tact, no focal deficits noted.      ASSESSMENT:    Epilepsy (based on clinical history). Epilepsy syndrome unknown.     Discussion: 27-year-old female who presents with recurrent seizure-like spells.  These spells are described as an aura, vision changes followed by whole body stiffening with low amplitude trembling and repetitive clicking noises.  I suspect the spell are generalized tonic-clonic seizure which usually occur at nighttime. Age of seizure onset was 17.  EEG in 2018 was notable for a burst of sharply contoured delta activity in the awake state.  It is difficult to decipher if these are generalized epileptiform discharges or a burst of delta and representing a mild encephalopathy.  I suspect she does have epilepsy and just not sure if it is generalized or focal  epilepsy.  Her aura suggests she may have focal epilepsy.  She does not have features consistent with juvenile myoclonic epilepsy, however.  At this time it is best that we use broad-spectrum antiepileptic agent.  Since we started lamotrigine and stopped oxcarbazepine she has become seizure-free.  The combination of lamotrigine and zonisamide has resulted in seizure freedom since 2018.  We will continue the same medications.    Additionally, if needed other medications that are broad-spectrum " that may be considered in the future are Depakote (if on birth control), topiramate (if on birth control), Fycompa, Vimpat; not in this particular order. Patient was agreeable with this plan of care.    In regards to ADHD, she started Ritalin.      In the past I reviewed risk of teratogenicity associated with current antiepileptic drug with patient,  importance of abstinence or birth control to prevent unplanned pregnancy while on antiepileptic drug, encourage she take folic acid 2-4 mg per day, and reviewed the importance of pregnancy planning. Patient expressed understanding of associated risks. She has Mirena.        PLAN:                Medication Name   Tablet Size         8 AM  (morning)   4pm  9 PM (Night)   Notes    Lamotrigine 100 mg   3 tablet    1 tablet       Zonisamide 100 mg      3 capsules                  Start folic acid 2 mg per day   Follow up  1 year          I spent 15 minutes with the patient. During this time counseling and coordination of care exceeded 50% of the face to face visit time. I addressed all questions the patient/caregiver raised in regards to the patient's medical care.     Kellee Nuñez MD

## 2023-07-23 ENCOUNTER — HEALTH MAINTENANCE LETTER (OUTPATIENT)
Age: 28
End: 2023-07-23

## 2023-09-06 NOTE — TELEPHONE ENCOUNTER
Last Clinic Visit: 7/9/2021  [facility] Epilepsy Care  Recommended 12 month follow up  No upcoming appointments  30 day refill per protocol, routed to clinic scheduling for follow up  Filling per [facility] medication refill protocols - seizure medications.  Not all labs required.       Hydroxychloroquine Pregnancy And Lactation Text: This medication has been shown to cause fetal harm but it isn't assigned a Pregnancy Risk Category. There are small amounts excreted in breast milk.

## 2023-11-08 NOTE — TELEPHONE ENCOUNTER
Mother calling to ask about zonisamide and if it is going to be discontinued.    Discussed that in the last note, the zonisamide order is to continue unchanged.    I discussed this with mother, and asked if the ordered changes in oxcarbazepine had been made.  Mom noted that the changes had not been made as Ana Paula had a stressful time coming up at school    I noted that the patient has a clinic appointment coming up with  in about tow weeks,m and I recommended mom make a list of her questions about the care plan, and discuss the questions with  at the visit.    Mom was okay with the plan   Please call patient.Her vitamin-D test is low, cholesterol is high.Advise patient she needs to take Vit D3 5000 IU daily with food,start low fat and low carb's diet, I would recommend statins if patient will agree I will send it to the pharmacy.Remaining blood test are normal.Recheck Vit d level, CMP and lipid profile in 6 months.Please place orders in patient chart.

## 2024-05-30 NOTE — LETTER
2021     RE: Ana Paula Rosenberg  : 1995   MRN: [id number]      Dear Colleague,    Thank you for referring your patient, Ana Paula Rosenberg, to the [facility] EPILEPSY [facility] at [facility]. Please see a copy of my visit note below.    Ana Paula is a 25 year old who is being evaluated via a billable video visit.      How would you like to obtain your AVS? MyChart  If the video visit is dropped, the invitation should be resent by: Text to cell phone: [phone]  Will anyone else be joining your video visit? Yes: mom, Marti and patient. How would they like to receive their invitation? Other e-mail: [email or web address] [phone]    [email or web address] and [email or web address]      Video Start Time: 2:02 PM  Video-Visit Details    Type of service:  Video Visit    Video End Time:2:30 pm     Originating Location (pt. Location): Home    Distant Location (provider location):  [facility] EPILEPSY [facility]ARE     Platform used for Video Visit: [facility]    Patient:  Ana Paula Rosenberg  :  1995   Age:  25 year old   Today's Office Visit:  2021    Epilepsy Data:         Epilepsy Data copied forward:  First seizure at the age of 17, 2013 she had generalized tonic-clonic convulsion. She had total of 8 seizures and it seems that she averages approximately 1-2 per year.  She was initially treated with levetiracetam . max dose was 1500 mg per day and that was not entirely helpful, because she had severe depression and severe irritability.  She was then transitioned at the age of 19 to oxcarbazepine, which has been gradually increased over the years to current dose.  However, the patient did have an episode of hyponatremia with sodium of 125 in 2018     Seizure type 1, she states she feels as though she is going in and out with her vision.  She describes it as though her contacts are not in her eye and her vision just feels off.  This note was copied from a baby's chart.  Mother/Baby being followed by lactation.  LC met parents; introduced self. Assisted mother with first breastfeeding session. Infant placed skin to skin in cross cradle hold to right breast. Infant awakened for feeds. Mouthing motions noted. Infant opened mouth, licked and tasted but did not latch. No effective breastfeeding noted this session. Infant noted to have a good bit of tongue thrusting during breastfeeding session.   LC assisted mother with pumping at bedside at 13:15 pm. Mother using 21 mm flange to left and 24 mm flange on right breast. Mother pumped last at 0830. Discussed the importance of frequent pumping or effective breastfeeding in first two weeks to establish a full breast milk supply. Encouraged effective breastfeeding or pumping 8 or more times in 24 hours and skin to skin care. Discussed pumping every 2-3 hours with only one 5-hour break without pumping for sleep. Recommended pumping post breastfeeding until infant fully established to breast and effectively breastfeeding. Pumping supplies at bedside. Encouragement and support offered to mom.   Beena Welch, BSN, RNC, CLC, IBCLC       This lasts approximately 2 minutes.  Followed by loss of consciousness and awareness, whole body stiffening, her eyes go back and she is low amplitude trembling and the clicking noise in her throat.  No morning jerks, she denies that.  Her mom has not witnessed that she stares off into space or is unresponsive.  The patient denies having recurrent episodes of auras.      Interval History:     Marti joined visit.  Last seizure was 10/2018 ( generalized tonic-clonic convulsion) and prior to 8/2018 and prior to this she had one generalized tonic-clonic convulsion per year.  Since her last visit she is taking lamotrigine and zonisamide.  Overall she is doing great.  She has a hard time focusing on her classes at school and would like to start ADHD meds.  I did review with her there is a small risk of breakthrough seizures, however the benefits outweigh the risks.  Additionally she does have anxiety and I encouraged her to exercise.  She has not had any emergency room visits or hospitalizations since I last saw her.  She has some fatigue.  She denies having dizziness, no double vision, no abdominal pain, no falls, no ataxia, no kidney.  Her weight is stable, she eats multiple meals per day. Overall she is very happy with this not want to change her seizure medications.      Current antiepileptic drug              Medication Name   Tablet Size         8 AM  (morning)   4pm  9 PM (Night)   Notes    Lamotrigine 100 mg  3 tablet    1 tablet       Zonisamide 100 mg       3 capsules        SOCIAL HISTORY:  She has no history of events,  physical, verbal or sexual trauma.  She had a stable upbringing and supportive family.  She is working at [employer]. 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  She is not .  She drinks 2-3 drinks a couple of times per month.  Does not smoke, does not do recreational drugs.  She has coffee intermittently.       PSYCHOLOGICAL HISTORY:  She currently does not have  any depressive symptoms.  She does have a little bit of anxiety.  She describes herself to be very friendly, talkative, spiritual, a people pleaser.  She describes her Temple to be [employer]. Encouraged she seek psychotherapy visits for relationship with her mother.      REVIEW OF SYSTEMS:  No headaches, no vision changes, no shortness of breath, no nausea/vomiting/diarrhea.  No upper or lower extremity weakness or numbness.        FEMALE HISTORY:  She started menstruating at the age of 15.  She is not pregnant.  She would like to get pregnant in the future. She is sexually active and not on contraceptive agent.      EXAMINATION There were no vitals taken for this visit.     Wt Readings from Last 4 Encounters:   01/30/20 130 lb (59 kg)   07/30/19 135 lb (61.2 kg)   05/16/19 136 lb (61.7 kg)   12/20/18 142 lb 6.4 oz (64.6 kg)     Alert, orientated, speech is fluent, face is symmetric, extra-ocular movement in tact, no focal deficits noted.      ASSESSMENT:    Epilepsy (based on clinical history). Epilepsy syndrome unknown.     Discussion: A 25-year-old female who presents with recurrent seizure-like spells.  These spells are described as an aura, vision changes followed by whole body stiffening with low amplitude trembling and repetitive clicking noises.  I suspect the spell are generalized tonic-clonic seizure which usually occur at nighttime. Age of seizure onset was 17.  EEG in 2018 was notable for a burst of sharply contoured delta activity in the awake state.  It is difficult to decipher if these are generalized epileptiform discharges or a burst of delta slowing that are simply sharply contoured and representing a mild encephalopathy.  I suspect she does have epilepsy and just not sure if it is generalized or focal  epilepsy.  Her aura suggests perhaps it may be a partial epilepsy.  She does not have features consistent with juvenile myoclonic epilepsy, however.  At this time it is best that we use  broad-spectrum antiepileptic agent.  Since we started lamotrigine and stopped oxcarbazepine she has become seizure-free.  The combination of lamotrigine and zonisamide has resulted in seizure freedom since 2018.  We will continue the same medications.    Additionally, if needed other medications that are broad-spectrum that may be considered in the future are Depakote (if on birth control), topiramate (if on birth control), Fycompa, Vimpat; not in this particular order. Patient was agreeable with this plan of care.    In regards to ADHD, she will review with her primary care doctor the best medication options.  She has a hard time at school and the ADHD meds will help her focus and accomplish her academic goals.  Certainly there is a low risk of seizures with ADHD med and I reviewed this with her.      In the past I reviewed risk of teratogenicity associated with current antiepileptic drug with patient,  importance of abstinence or birth control to prevent unplanned pregnancy while on antiepileptic drug, encourage she take folic acid 2-4 mg per day, and reviewed the importance of pregnancy planning. Patient expressed understanding of associated risks.  In the past she has not had birth control because she is on her parents's insurance.         PLAN:                Medication Name   Tablet Size         8 AM  (morning)   4pm  9 PM (Night)   Notes    Lamotrigine 100 mg   3 tablet    1 tablet       Zonisamide 100 mg      3 capsules                  Start folic acid 2 mg per day   Talk to primary care provider about ADHD medications   Exercise for anxiety   Follow up  12 months   Check antiepileptic drug for efficacy, toxicity, and side effects near home.  Follow up  1 year          I spent 29 minutes with the patient. During this time counseling and coordination of care exceeded 50% of the face to face visit time. I addressed all questions the patient/caregiver raised in regards to the patient's medical care.     Kellee  AARON Nuñez MD

## 2024-09-15 ENCOUNTER — HEALTH MAINTENANCE LETTER (OUTPATIENT)
Age: 29
End: 2024-09-15

## 2025-02-23 NOTE — TELEPHONE ENCOUNTER
MD filled prescription request. Writer send a letter with reminder that refill requests must be made well in advance of running out of medication.   
Patient is asking that medication be filled in the next couple of hours as she will be leaving town shortly & she is out of medication.  
Patient is out of medication and has appt on 10/31/2022.  
Near syncope